# Patient Record
Sex: FEMALE | Race: WHITE | NOT HISPANIC OR LATINO | Employment: FULL TIME | URBAN - METROPOLITAN AREA
[De-identification: names, ages, dates, MRNs, and addresses within clinical notes are randomized per-mention and may not be internally consistent; named-entity substitution may affect disease eponyms.]

---

## 2017-04-21 ENCOUNTER — HISTORICAL (OUTPATIENT)
Dept: RADIOLOGY | Facility: HOSPITAL | Age: 52
End: 2017-04-21

## 2018-04-27 ENCOUNTER — HISTORICAL (OUTPATIENT)
Dept: RADIOLOGY | Facility: HOSPITAL | Age: 53
End: 2018-04-27

## 2018-11-01 ENCOUNTER — HISTORICAL (OUTPATIENT)
Dept: ADMINISTRATIVE | Facility: HOSPITAL | Age: 53
End: 2018-11-01

## 2018-11-08 ENCOUNTER — HISTORICAL (OUTPATIENT)
Dept: MEDSURG UNIT | Facility: HOSPITAL | Age: 53
End: 2018-11-08

## 2018-11-08 ENCOUNTER — HISTORICAL (OUTPATIENT)
Dept: SURGERY | Facility: HOSPITAL | Age: 53
End: 2018-11-08

## 2018-11-08 LAB — POC BETA-HCG (QUAL): NEGATIVE

## 2018-11-09 LAB
ABS NEUT (OLG): 6.28 X10(3)/MCL (ref 2.1–9.2)
ALBUMIN SERPL-MCNC: 3 GM/DL (ref 3.4–5)
ALBUMIN/GLOB SERPL: 1 RATIO (ref 1.1–2)
ALP SERPL-CCNC: 67 UNIT/L (ref 38–126)
ALT SERPL-CCNC: 54 UNIT/L (ref 12–78)
AST SERPL-CCNC: 27 UNIT/L (ref 15–37)
BASOPHILS # BLD AUTO: 0.1 X10(3)/MCL (ref 0–0.2)
BASOPHILS NFR BLD AUTO: 1 %
BILIRUB SERPL-MCNC: 0.6 MG/DL (ref 0.2–1)
BILIRUBIN DIRECT+TOT PNL SERPL-MCNC: 0.1 MG/DL (ref 0–0.5)
BILIRUBIN DIRECT+TOT PNL SERPL-MCNC: 0.5 MG/DL (ref 0–0.8)
BUN SERPL-MCNC: 5 MG/DL (ref 7–18)
CALCIUM SERPL-MCNC: 8.5 MG/DL (ref 8.5–10.1)
CHLORIDE SERPL-SCNC: 106 MMOL/L (ref 98–107)
CO2 SERPL-SCNC: 32 MMOL/L (ref 21–32)
CREAT SERPL-MCNC: 0.49 MG/DL (ref 0.55–1.02)
EOSINOPHIL # BLD AUTO: 0.2 X10(3)/MCL (ref 0–0.9)
EOSINOPHIL NFR BLD AUTO: 2 %
ERYTHROCYTE [DISTWIDTH] IN BLOOD BY AUTOMATED COUNT: 13.6 % (ref 11.5–17)
GLOBULIN SER-MCNC: 3.1 GM/DL (ref 2.4–3.5)
GLUCOSE SERPL-MCNC: 75 MG/DL (ref 74–106)
HCT VFR BLD AUTO: 41.8 % (ref 37–47)
HGB BLD-MCNC: 13.3 GM/DL (ref 12–16)
INR PPP: 1.01 (ref 0–1.27)
LIPASE SERPL-CCNC: 73 UNIT/L (ref 73–393)
LYMPHOCYTES # BLD AUTO: 2.8 X10(3)/MCL (ref 0.6–4.6)
LYMPHOCYTES NFR BLD AUTO: 27 %
MCH RBC QN AUTO: 31.3 PG (ref 27–31)
MCHC RBC AUTO-ENTMCNC: 31.8 GM/DL (ref 33–36)
MCV RBC AUTO: 98.4 FL (ref 80–94)
MONOCYTES # BLD AUTO: 1 X10(3)/MCL (ref 0.1–1.3)
MONOCYTES NFR BLD AUTO: 9 %
NEUTROPHILS # BLD AUTO: 6.28 X10(3)/MCL (ref 2.1–9.2)
NEUTROPHILS NFR BLD AUTO: 61 %
PLATELET # BLD AUTO: 204 X10(3)/MCL (ref 130–400)
PMV BLD AUTO: 10.4 FL (ref 9.4–12.4)
POTASSIUM SERPL-SCNC: 4.3 MMOL/L (ref 3.5–5.1)
PROT SERPL-MCNC: 6.1 GM/DL (ref 6.4–8.2)
PROTHROMBIN TIME: 13.6 SECOND(S) (ref 12.2–14.7)
RBC # BLD AUTO: 4.25 X10(6)/MCL (ref 4.2–5.4)
SODIUM SERPL-SCNC: 143 MMOL/L (ref 136–145)
WBC # SPEC AUTO: 10.4 X10(3)/MCL (ref 4.5–11.5)

## 2018-11-10 LAB
ABS NEUT (OLG): 4.29 X10(3)/MCL (ref 2.1–9.2)
ALBUMIN SERPL-MCNC: 2.7 GM/DL (ref 3.4–5)
ALBUMIN/GLOB SERPL: 0.8 {RATIO}
ALP SERPL-CCNC: 76 UNIT/L (ref 38–126)
ALT SERPL-CCNC: 57 UNIT/L (ref 12–78)
AST SERPL-CCNC: 35 UNIT/L (ref 15–37)
BASOPHILS # BLD AUTO: 0.1 X10(3)/MCL (ref 0–0.2)
BASOPHILS NFR BLD AUTO: 1 %
BILIRUB SERPL-MCNC: 0.4 MG/DL (ref 0.2–1)
BILIRUBIN DIRECT+TOT PNL SERPL-MCNC: 0.1 MG/DL (ref 0–0.2)
BILIRUBIN DIRECT+TOT PNL SERPL-MCNC: 0.3 MG/DL (ref 0–0.8)
BUN SERPL-MCNC: 4 MG/DL (ref 7–18)
CALCIUM SERPL-MCNC: 8.1 MG/DL (ref 8.5–10.1)
CHLORIDE SERPL-SCNC: 105 MMOL/L (ref 98–107)
CO2 SERPL-SCNC: 32 MMOL/L (ref 21–32)
CREAT SERPL-MCNC: 0.55 MG/DL (ref 0.55–1.02)
EOSINOPHIL # BLD AUTO: 0.4 X10(3)/MCL (ref 0–0.9)
EOSINOPHIL NFR BLD AUTO: 5 %
ERYTHROCYTE [DISTWIDTH] IN BLOOD BY AUTOMATED COUNT: 13.6 % (ref 11.5–17)
GLOBULIN SER-MCNC: 3.2 GM/DL (ref 2.4–3.5)
GLUCOSE SERPL-MCNC: 98 MG/DL (ref 74–106)
HCT VFR BLD AUTO: 42.2 % (ref 37–47)
HGB BLD-MCNC: 13.6 GM/DL (ref 12–16)
LYMPHOCYTES # BLD AUTO: 1.8 X10(3)/MCL (ref 0.6–4.6)
LYMPHOCYTES NFR BLD AUTO: 25 %
MCH RBC QN AUTO: 31 PG (ref 27–31)
MCHC RBC AUTO-ENTMCNC: 32.2 GM/DL (ref 33–36)
MCV RBC AUTO: 96.1 FL (ref 80–94)
MONOCYTES # BLD AUTO: 0.7 X10(3)/MCL (ref 0.1–1.3)
MONOCYTES NFR BLD AUTO: 10 %
NEUTROPHILS # BLD AUTO: 4.29 X10(3)/MCL (ref 2.1–9.2)
NEUTROPHILS NFR BLD AUTO: 59 %
PLATELET # BLD AUTO: 190 X10(3)/MCL (ref 130–400)
PMV BLD AUTO: 10.3 FL (ref 9.4–12.4)
POTASSIUM SERPL-SCNC: 4.4 MMOL/L (ref 3.5–5.1)
PROT SERPL-MCNC: 5.9 GM/DL (ref 6.4–8.2)
RBC # BLD AUTO: 4.39 X10(6)/MCL (ref 4.2–5.4)
SODIUM SERPL-SCNC: 142 MMOL/L (ref 136–145)
WBC # SPEC AUTO: 7.3 X10(3)/MCL (ref 4.5–11.5)

## 2018-12-18 LAB — CRC RECOMMENDATION EXT: NORMAL

## 2019-03-08 ENCOUNTER — HISTORICAL (OUTPATIENT)
Dept: ADMINISTRATIVE | Facility: HOSPITAL | Age: 54
End: 2019-03-08

## 2019-03-20 ENCOUNTER — HISTORICAL (OUTPATIENT)
Dept: RADIOLOGY | Facility: HOSPITAL | Age: 54
End: 2019-03-20

## 2019-05-03 ENCOUNTER — HISTORICAL (OUTPATIENT)
Dept: RADIOLOGY | Facility: HOSPITAL | Age: 54
End: 2019-05-03

## 2020-03-18 ENCOUNTER — HISTORICAL (OUTPATIENT)
Dept: ADMINISTRATIVE | Facility: HOSPITAL | Age: 55
End: 2020-03-18

## 2020-08-03 ENCOUNTER — HISTORICAL (OUTPATIENT)
Dept: RADIOLOGY | Facility: HOSPITAL | Age: 55
End: 2020-08-03

## 2021-06-30 LAB
PAP RECOMMENDATION EXT: NORMAL
PAP SMEAR: NORMAL

## 2021-07-02 ENCOUNTER — HISTORICAL (OUTPATIENT)
Dept: ADMINISTRATIVE | Facility: HOSPITAL | Age: 56
End: 2021-07-02

## 2021-08-06 ENCOUNTER — HISTORICAL (OUTPATIENT)
Dept: RADIOLOGY | Facility: HOSPITAL | Age: 56
End: 2021-08-06

## 2022-02-18 ENCOUNTER — CLINICAL SUPPORT (OUTPATIENT)
Dept: NUTRITION | Facility: CLINIC | Age: 57
End: 2022-02-18
Payer: COMMERCIAL

## 2022-02-18 DIAGNOSIS — Z71.3 DIETARY COUNSELING AND SURVEILLANCE: Primary | ICD-10-CM

## 2022-02-18 PROCEDURE — 97802 PR MED NUTR THER, 1ST, INDIV, EA 15 MIN: ICD-10-PCS | Mod: 95,,, | Performed by: NUTRITIONIST

## 2022-02-18 PROCEDURE — 97802 MEDICAL NUTRITION INDIV IN: CPT | Mod: 95,,, | Performed by: NUTRITIONIST

## 2022-02-18 NOTE — PROGRESS NOTES
"Nutrition Assessment for Initial Medical Nutrition Therapy  The patient location is: [place]  The chief complaint leading to consultation is: weight loss    Visit type: audiovisual    Face to Face time with patient: 1 hour of total time spent on the encounter, which includes face to face time and non-face to face time preparing to see the patient (eg, review of tests), Obtaining and/or reviewing separately obtained history, Documenting clinical information in the electronic or other health record, Independently interpreting results (not separately reported) and communicating results to the patient/family/caregiver, or Care coordination (not separately reported).         Each patient to whom he or she provides medical services by telemedicine is:  (1) informed of the relationship between the physician and patient and the respective role of any other health care provider with respect to management of the patient; and (2) notified that he or she may decline to receive medical services by telemedicine and may withdraw from such care at any time.      Reason for MNT visit: Pt in for education and nutrition counseling regarding dietary counseling for goal of body fat loss      ASSESSMENT    Age: 56 y.o.  Wt: 236 lbs  Wt Readings from Last 1 Encounters:   No data found for Wt     Ht: 5'4"  Ht Readings from Last 1 Encounters:   No data found for Ht     BMI: 40.5  BMI Readings from Last 1 Encounters:   No data found for BMI       Clinical Signs/Symptoms: none to assess    Medical History: No past medical history on file.      Medications: No current outpatient medications on file.    Food allergies  Intolerances: NKFA    Labs:  Reviewed and noted  No results found for: HGBA1C  No results found for: CHOL, TRIG, HDL, LDLCALC    Typical day recall: reviewed and noted during virtual consult    Beverages: water: 7 bottles per day    Dining out: rare    Alcohol: nonsmoker; drinks a glass of wine or two socially    Lifestyle " Influences  Support System: self (recently )    Meal preparation/shopping: self    Current Activity Level: walks 8,000-10,000 steps per day    Patient motivation, anticipated barriers, expected compliance: patient is motivated and has verbalized understanding and intent to comply    DIAGNOSIS   Problem: Excessive Energy Intake  Etiology: RT eating too many calories  Signs/Symptoms: AEB 24 hour recall and BMI    INTERVENTION  Nutrition prescription: estimated energy requirements: patient is motivated and has verbalized understanding and intent to comply      Recommendations & Goals:  Patient goals and recommendations are tailored to the specific patient's needs, readiness to change, lifestyle, culture, skills, resources, & abilities. Strategies to help achieve these nutrition-related goals were discussed which can include but are not limited to SMART goal setting & mindful eating.     Aim for a minimum of 7 hours sleep   Exercise 60 minutes most days  Eat breakfast within 1-2 hours of waking up  Try not to skip any meals or snacks, not going more than 3-4 hours without eating.   At each meal and snack, try to include a source of fiber + lean protein + healthy fat.       Written Materials Provided  These resources are intended to assist the patient in making it easier to choose recommended options when eating out & to identify better-for-you brands at the grocery store:     Meal Planning Guide with recommendations discussed along with portion sizes and a customized meal plan    Eat Fit stu card as a reminder to download the stu to ones smart phone which provides: current Eat Fit partners, approved menu options, food labels for carb counting, & recipes    Nutritious On The Go Guide   Eat Fit Grocery Product list    RD contact information- for patient to contact regarding any questions, needs, and/or concerns that may arise     MONITORING & EVALUATION    Communicated with healthcare provider    Documented  plan for referral to appropriate agency/healthcare provider as needed    Comprehension: fair     Motivation to change: moderate    Follow-up: 1 month or prn    Counseling time: 1 Hour

## 2022-04-07 ENCOUNTER — HISTORICAL (OUTPATIENT)
Dept: ADMINISTRATIVE | Facility: HOSPITAL | Age: 57
End: 2022-04-07
Payer: COMMERCIAL

## 2022-04-24 VITALS
DIASTOLIC BLOOD PRESSURE: 68 MMHG | OXYGEN SATURATION: 98 % | WEIGHT: 220 LBS | HEIGHT: 64 IN | SYSTOLIC BLOOD PRESSURE: 120 MMHG | BODY MASS INDEX: 37.56 KG/M2

## 2022-04-28 NOTE — OP NOTE
Patient:   Trista Nielsen            MRN: [id number]            FIN: [id number]               Age:   53 years     Sex:  Female     :  1965   Associated Diagnoses:   None   Author:   Dieudonne Messina MD            DATE OF SURGERY:   18    SURGEON:  Dieudonne Messina MD    PREOPERATIVE DIAGNOSIS: 1.  Symptomatic Cholelithiasis    POST OPERATIVE DIAGNOSIS: 1.  Same.       2.  Choledocholithiasis    PROCEDURE:  Laparoscopic cholecystectomy with Intra-operative cholangiogram.    ANESTHESIA:  General endotracheal anesthesia.    ESTIMATED BLOOD LOSS:  Approximately 20 cc.   Blood administered, none.    LAP AND INSTRUMENT COUNT:  Correct X2 at the end of the case.    SPECIMENS:  Gallbladder.    INDICATION AND SIGNIFICANT HISTORY:  Patient is a 53-year-old female complaining of post prandial right upper quadrant pain associated with fatty meals.  Patient was worked up clincally and found to have symptomatic cholelithiasis.  Risks and benefits of surgery was discussed with the patient who voiced understanding of risks / benefits and elected to proceed with surgery.        PROCEDURE IN DETAIL:  Once informed consents were obtained, patient was taken to the operating room and placed supine on the operating table.  After general endotracheal anesthesia was induced, the abdomen was prepped and draped in the standard sterile surgical fashion.  Periumbilical incision was made with the scalpel and the Veress needle was used to enter the abdominal cavity.  Pneumoperitoneum was then created with insufflation.  After adequate insufflation was obtained, 11 millimeter trocar port were placed through this wound.  Two additional 5 millimeter ports were placed in the right upper quadrant followed by 5 millimeter trocar placed subxiphoid.  The gallbladder was then visualized appeared to have chronic inflammatory changes and retracted both superiorly and laterally to achieve the critical view.  Dissection was  carried out in lateral to medial fashion.  The cystic duct were first visualized followed by cystic artery appropriate.  The common bile duct was visualized and appreared dilated.  The Mitchell clamp was used to perform a cholangiogram and the patient appered to have multiple common duct stones.  The cystic duct was then controlled with a chromic loop (for preperation of ERCP).  Two clips were placed twice proximally on the cystic artery and one distally and resected.  The gallbladder was then removed from the liver bed using the hook cautery and passed off the table as specimen through the periumbilical trocar port site.  Attention was then redirected to the gallbladder fossa, no active bleeding was noted.  Good hemostasis was visualized.  All ports were then removed under direct visualization with no active bleeding noted.  Skin was then closed with 4-0 Vicryl suture in interrupted fashion.  Skin was  cleaned and dry sterile dressing was placed on the wound.  Lap and instrument  counts were correct X2 at the end of the case.  Patient tolerated the procedure well and was transferred to recovery room in good condition with plans to consult GI for ERCP.

## 2022-04-28 NOTE — CONSULTS
Patient:   Trista Nielsen            MRN: [id number]            FIN: [id number]               Age:   53 years     Sex:  Female     :  1965   Associated Diagnoses:   None   Author:   Hope Colunga      Consultation Information   Consultation:  Siddharth Koch MD, Dieudonne PRINCE       Basic Information   Admit information:  CBD stones post Lap choley .    Source of history:  Self, Spouse, Medical record.    Present at bedside:  Significant other.    Referral source:  Emergency department.    History limitation:  None.       Chief Complaint   stones in CBD post lap choley      History of Present Illness   This is a very pleasant 52 yo female who underwent a laproscopic choleycystectomy yesterday at [facility] with Dr. Messina. Pt IOC showed abdnormalities with stones and sludge. Pt was subsequently admitted to [facility] for ERCP. Pt has slight psot op tenderness to abdomen, some left shuolder pain and lap sites are CDI. Pt not passing flatus yet, is ambulating. LFTs and Lipase are within normal limits. We will plan ERCP today.        Review of Systems   Constitutional:  Negative.    Eye:  Negative.    Ear/Nose/Mouth/Throat:  Negative.    Respiratory:  Negative.    Cardiovascular:  Negative.    Gastrointestinal:  Negative except as documented in history of present illness.    Genitourinary:  Negative.    Hematology/Lymphatics:  Negative.    Endocrine:  Negative.    Immunologic:  Negative.    Musculoskeletal:  Negative.    Integumentary:  Negative.    Neurologic:  Alert and oriented X4.    Psychiatric:  Negative.       Health Status   Allergies:    Allergic Reactions (Selected)  No Known Allergies   Current medications:  (Selected)   Inpatient Medications  Ordered  Demerol HCl 50 mg/mL injectable solution: 50 mg, form: Injection, IV, q2hr PRN for pain, first dose 18 18:31:00 CST  Lactated Ringers 1000ml 1,000 mL: 1,000 mL, 1,000 mL, IV, 75 mL/hr, start date 18 18:03:00  CST  Zofran 2 mg/mL injectable solution: 4 mg, form: Injection, IV Push, q4hr PRN for nausea, first dose 18 19:32:00 CST  Prescriptions  Prescribed  Levsin SL 0.125 mg sublingual tablet: 0.125 mg = 1 tab(s), SL, q4hr, PRN PRN cramping/spasm, # 18 tab(s), 0 Refill(s), Pharmacy: [id number]  Documented Medications  Documented  BUSPIRONE    TAB 15M.5 mg = 0.5 tab(s), Oral, BID  Vitamin D3 5000 intl units oral tablet: 5,000 IntUnit = 1 tab(s), Oral, Daily, # 100 tab(s), 0 Refill(s)  Zyrtec 10 mg oral tablet: 10 mg = 1 tab(s), Oral, Daily, # 30 tab(s), 0 Refill(s)  multivitamin with minerals (Adult Tab): 1 tab(s), Oral, Daily, # 30 tab(s), 0 Refill(s)  raNITIdine 150 mg oral capsule: 150 mg = 1 cap(s), Oral, Daily, # 90 cap(s), 0 Refill(s)      Histories   Past Medical History:    Active  GERD - Gastro-esophageal reflux disease ([id number])   Family History:    Acute myocardial infarction.  Father  Heart disease.  Father  Depression.  Mother  Hyperlipidemia.  Mother     Procedure history:    Cholecystectomy Laparoscopic (None) on 2018 at 53 Years.  Comments:  2018 14:04 - Macie Hutchison RN  auto-populated from documented surgical case  Cholangiogram (Surgery) on 2018 at 53 Years.  Comments:  2018 14:04 Macie Cote RN  auto-populated from documented surgical case  Thermal Ablation (.) on 2016 at 50 Years.  Comments:  2016 13:Tammy Stahl RN  auto-populated from documented surgical case  Tubal Ligation Laparoscopic (.) on 2016 at 50 Years.  Comments:  2016 13:Tammy Stahl RN  auto-populated from documented surgical case  D and C.  Comments:  2018 10:56 - Divya Velasquez RN  2010  EGD.  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        Physical Examination   General:  Alert and oriented, No acute distress.    Eye:  Pupils are equal, round and reactive to light, Extraocular movements are intact, Normal conjunctiva.    HENT:  Normocephalic, Tympanic membranes are clear, Normal hearing, Oral mucosa is moist, No pharyngeal erythema.    Neck:  Supple, Non-tender, No carotid bruit, No jugular venous distention.    Respiratory:  Lungs are clear to auscultation, Respirations are non-labored, Breath sounds are equal, Symmetrical chest wall expansion.    Cardiovascular:  Normal rate, Regular rhythm, No edema.    Gastrointestinal:  Soft, Non-distended, hypoactive, mild tenderness, lap sites CDI.    Genitourinary:  No costovertebral angle tenderness.    Musculoskeletal:  Normal range of motion, Normal strength.    Integumentary:  Warm, Dry, Pink.    Cognition and Speech:  Speech clear and coherent.       Review / Management   Results review:  All Results   11/9/2018 7:16 CST       WBC                       10.4 x10(3)/mcL                             RBC                       4.25 x10(6)/mcL                             Hgb                       13.3 gm/dL                             Hct                       41.8 %                             Platelet                  204 x10(3)/mcL                             MCV                       98.4 fL  HI                             MCH                       31.3 pg  HI                             MCHC                      31.8 gm/dL  LOW                             RDW                       13.6 %                             MPV                        10.4 fL                             Abs Neut                  6.28 x10(3)/mcL                             Neutro Auto               61 %  NA                             Lymph Auto                27 %  NA                             Mono Auto                 9 %  NA                             Eos Auto                  2 %  NA                             Abs Eos                   0.2 x10(3)/mcL                             Basophil Auto             1 %  NA                             Abs Neutro                6.28 x10(3)/mcL                             Abs Lymph                 2.8 x10(3)/mcL                             Abs Mono                  1.0 x10(3)/mcL                             Abs Baso                  0.1 x10(3)/mcL                             PT                        13.6 second(s)                             INR                       1.01                             Sodium Lvl                143 mmol/L                             Potassium Lvl             4.3 mmol/L                             Chloride                  106 mmol/L                             CO2                       32.0 mmol/L                             Calcium Lvl               8.5 mg/dL                             Glucose Lvl               75 mg/dL                             BUN                       5.0 mg/dL  LOW                             Creatinine                0.49 mg/dL  LOW                             eGFR-AA                   >60 mL/min/1.73 m2  NA                             eGFR-SHERWIN                  >60 mL/min/1.73 m2  NA                             Bili Total                0.6 mg/dL                             Bili Direct               0.10 mg/dL                             Bili Indirect             0.50 mg/dL                             AST                       27 unit/L                             ALT                       54 unit/L                             Alk Phos                  67 unit/L                              Total Protein             6.1 gm/dL  LOW                             Albumin Lvl               3.00 gm/dL  LOW                             Globulin                  3.10 gm/dL                             A/G Ratio                 1.0 ratio  LOW                             Lipase Lvl                73 unit/L  .       Impression and Plan   Orders     IMPRESSION/PLAN  1. Abdmormal IOC - CBD stones/sludge post lap choley    keep NPO and proceed with ERCP at noon    Thank you for this consultation, we will be happy to follow with you.        Professional Services   Entered by Hope Miranda NP, acting as a scribe for Van Menjivar MD

## 2022-04-28 NOTE — H&P
"   Patient:   Trista Nielsen            MRN: [id number]            FIN: [id number]               Age:   53 years     Sex:  Female     :  1965   Associated Diagnoses:   None   Author:   Dieudonne Messina MD      Chief Complaint   "CBD stones"      History of Present Illness     52 yo female s/p cholecystectomy with IOC with CBD stones.    See previous H&P      Review of Systems   Eye:  Negative.    Ear/Nose/Mouth/Throat:  Negative.    Respiratory:  Negative.    Cardiovascular:  Negative.    Gastrointestinal:  Negative.    Genitourinary:  Negative.    Hematology/Lymphatics:  Negative.    Endocrine:  Negative.    Immunologic:  Negative.    Musculoskeletal:  Negative.    Integumentary:  Negative.    Neurologic:  Negative.    Psychiatric:  Negative.    All other systems are negative      Health Status   Allergies:    Allergic Reactions (Selected)  No Known Allergies   Current medications:  (Selected)   Prescriptions  Prescribed  Levsin SL 0.125 mg sublingual tablet: 0.125 mg = 1 tab(s), SL, q4hr, PRN PRN cramping/spasm, # 18 tab(s), 0 Refill(s), Pharmacy: [id number]  Documented Medications  Documented  BUSPIRONE    TAB 15M.5 mg = 0.5 tab(s), Oral, BID  Vitamin D3 5000 intl units oral tablet: 5,000 IntUnit = 1 tab(s), Oral, Daily, # 100 tab(s), 0 Refill(s)  Zyrtec 10 mg oral tablet: 10 mg = 1 tab(s), Oral, Daily, # 30 tab(s), 0 Refill(s)  multivitamin with minerals (Adult Tab): 1 tab(s), Oral, Daily, # 30 tab(s), 0 Refill(s)  raNITIdine 150 mg oral capsule: 150 mg = 1 cap(s), Oral, Daily, # 90 cap(s), 0 Refill(s)   Problem list:    All Problems  Anxiety / SNOMED CT [id number] / Confirmed  Esophageal spasm / SNOMED CT 7784174550 / Confirmed  ER 18, 18  Gallstones / SNOMED CT 358965042 / Confirmed  GERD (gastroesophageal reflux disease) / SNOMED CT [id number] / Confirmed  GERD - Gastro-esophageal reflux disease / SNOMED CT " 4292103513 / Confirmed  Hiatal hernia / SNOMED CT [id number] / Confirmed  Migraines / SNOMED CT [id number] / Confirmed  Ovarian cyst / SNOMED CT 426851015 / Confirmed  ER on 11/2/18 on the right side  Seasonal allergy / SNOMED CT 2421276666 / Confirmed  Wears glasses / SNOMED CT 465447120 / Confirmed,    Active Problems (10)  Anxiety   Esophageal spasm   Gallstones   GERD (gastroesophageal reflux disease)   GERD - Gastro-esophageal reflux disease   Hiatal hernia   Migraines   Ovarian cyst   Seasonal allergy   Wears glasses       Histories   Past Medical History:    Active  GERD - Gastro-esophageal reflux disease ([id number])   Family History:    Acute myocardial infarction.  Father  Heart disease.  Father  Depression.  Mother  Hyperlipidemia.  Mother     Procedure history:    Endoscopic Retrograde Cholangiopancreatography performed by Van Menjivar MD on 11/9/2018 at 53 Years.  Comments:  11/12/2018 10:42 - Rosaura Barth RN  auto-populated from documented surgical case  Sphincterotomy performed by Van Menjivar MD on 11/9/2018 at 53 Years.  Comments:  11/12/2018 10:42 - Rosaura Barth RN  auto-populated from documented surgical case  ERCP w/removal of calculi/debris from biliary/pancreatic duct(s) performed by Van Menjivar MD on 11/9/2018 at 53 Years.  Comments:  11/12/2018 10:42 - Rosaura Barth RN  auto-populated from documented surgical case  Cholecystectomy Laparoscopic (None) performed by Dieudonne Messina MD on 11/8/2018 at 53 Years.  Comments:  11/8/2018 14:04 - Macie Hutchison RN  auto-populated from documented surgical case  Cholangiogram (Surgery) performed by Dieudonne Messina MD on 11/8/2018 at 53 Years.  Comments:  11/8/2018 14:04 - Macie Hutchison RN.  auto-populated from documented surgical case  Thermal Ablation (.) performed by Belen Healy MD on 8/23/2016 at 50 Years.  Comments:  8/23/2016 13:11 - Tammy Rico RN  BERTHA.  auto-populated from documented surgical case  Tubal Ligation Laparoscopic (.) performed by Belen Healy MD on 8/23/2016 at 50 Years.  Comments:  8/23/2016 13:11 - Tammy Rico RN  auto-populated from documented surgical case  D and C.  Comments:  11/7/2018 10:56 - Divya Velasquez RN  2010  EGD.  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        Physical Examination   General:  Alert and oriented.    Eye:  Pupils are equal, round and reactive to light.    HENT:  Normocephalic.    Neck:  Supple.    Respiratory:  Lungs are clear to auscultation.    Cardiovascular:  Normal rate.    Gastrointestinal:  Soft, Non-tender, Non-distended, Normal bowel sounds.       No qualifying data available   Genitourinary:  No costovertebral angle tenderness.    Musculoskeletal:  Normal range of motion, Normal strength.    Neurologic:  Alert, Oriented.       Impression and Plan     52 yo yo female with CBD    -  GI - already spoke to  -  NPO

## 2022-04-28 NOTE — CONSULTS
Patient:   Trista Nielsen            MRN: [id number]            FIN: [id number]               Age:   53 years     Sex:  Female     :  1965   Associated Diagnoses:   None   Author:   Geovanny ESTES, Ministerio BOONE      Basic Information   Review finding of laparoscopy concerning fallopian tube   retrograde bleeding  Patient asymptomatic  Plan: she will follow up with Dr. Healy next week

## 2022-06-21 ENCOUNTER — OFFICE VISIT (OUTPATIENT)
Dept: ORTHOPEDICS | Facility: CLINIC | Age: 57
End: 2022-06-21
Payer: COMMERCIAL

## 2022-06-21 ENCOUNTER — HOSPITAL ENCOUNTER (OUTPATIENT)
Dept: RADIOLOGY | Facility: CLINIC | Age: 57
Discharge: HOME | End: 2022-06-21
Attending: ORTHOPAEDIC SURGERY
Payer: COMMERCIAL

## 2022-06-21 VITALS
SYSTOLIC BLOOD PRESSURE: 120 MMHG | WEIGHT: 220 LBS | HEIGHT: 64 IN | BODY MASS INDEX: 37.56 KG/M2 | DIASTOLIC BLOOD PRESSURE: 68 MMHG

## 2022-06-21 DIAGNOSIS — M70.52 PES ANSERINUS BURSITIS OF LEFT KNEE: Primary | ICD-10-CM

## 2022-06-21 DIAGNOSIS — E66.01 OBESITIES, MORBID: ICD-10-CM

## 2022-06-21 DIAGNOSIS — M25.562 ACUTE PAIN OF LEFT KNEE: ICD-10-CM

## 2022-06-21 PROCEDURE — 3078F PR MOST RECENT DIASTOLIC BLOOD PRESSURE < 80 MM HG: ICD-10-PCS | Mod: CPTII,,, | Performed by: ORTHOPAEDIC SURGERY

## 2022-06-21 PROCEDURE — 1160F RVW MEDS BY RX/DR IN RCRD: CPT | Mod: CPTII,,, | Performed by: ORTHOPAEDIC SURGERY

## 2022-06-21 PROCEDURE — 1159F PR MEDICATION LIST DOCUMENTED IN MEDICAL RECORD: ICD-10-PCS | Mod: CPTII,,, | Performed by: ORTHOPAEDIC SURGERY

## 2022-06-21 PROCEDURE — 3078F DIAST BP <80 MM HG: CPT | Mod: CPTII,,, | Performed by: ORTHOPAEDIC SURGERY

## 2022-06-21 PROCEDURE — 99204 OFFICE O/P NEW MOD 45 MIN: CPT | Mod: 25,,, | Performed by: ORTHOPAEDIC SURGERY

## 2022-06-21 PROCEDURE — 3008F BODY MASS INDEX DOCD: CPT | Mod: CPTII,,, | Performed by: ORTHOPAEDIC SURGERY

## 2022-06-21 PROCEDURE — 20610 LARGE JOINT ASPIRATION/INJECTION: L ANSERINE BURSA: ICD-10-PCS | Mod: LT,,, | Performed by: ORTHOPAEDIC SURGERY

## 2022-06-21 PROCEDURE — 73562 XR KNEE 3 VIEW LEFT: ICD-10-PCS | Mod: LT,,, | Performed by: ORTHOPAEDIC SURGERY

## 2022-06-21 PROCEDURE — 1159F MED LIST DOCD IN RCRD: CPT | Mod: CPTII,,, | Performed by: ORTHOPAEDIC SURGERY

## 2022-06-21 PROCEDURE — 3008F PR BODY MASS INDEX (BMI) DOCUMENTED: ICD-10-PCS | Mod: CPTII,,, | Performed by: ORTHOPAEDIC SURGERY

## 2022-06-21 PROCEDURE — 3074F PR MOST RECENT SYSTOLIC BLOOD PRESSURE < 130 MM HG: ICD-10-PCS | Mod: CPTII,,, | Performed by: ORTHOPAEDIC SURGERY

## 2022-06-21 PROCEDURE — 73562 X-RAY EXAM OF KNEE 3: CPT | Mod: LT,,, | Performed by: ORTHOPAEDIC SURGERY

## 2022-06-21 PROCEDURE — 99204 PR OFFICE/OUTPT VISIT, NEW, LEVL IV, 45-59 MIN: ICD-10-PCS | Mod: 25,,, | Performed by: ORTHOPAEDIC SURGERY

## 2022-06-21 PROCEDURE — 3074F SYST BP LT 130 MM HG: CPT | Mod: CPTII,,, | Performed by: ORTHOPAEDIC SURGERY

## 2022-06-21 PROCEDURE — 1160F PR REVIEW ALL MEDS BY PRESCRIBER/CLIN PHARMACIST DOCUMENTED: ICD-10-PCS | Mod: CPTII,,, | Performed by: ORTHOPAEDIC SURGERY

## 2022-06-21 PROCEDURE — 20610 DRAIN/INJ JOINT/BURSA W/O US: CPT | Mod: LT,,, | Performed by: ORTHOPAEDIC SURGERY

## 2022-06-21 RX ORDER — CETIRIZINE HYDROCHLORIDE 10 MG/1
CAPSULE, LIQUID FILLED ORAL
COMMUNITY
Start: 2018-08-01

## 2022-06-21 RX ORDER — BETAMETHASONE SODIUM PHOSPHATE AND BETAMETHASONE ACETATE 3; 3 MG/ML; MG/ML
6 INJECTION, SUSPENSION INTRA-ARTICULAR; INTRALESIONAL; INTRAMUSCULAR; SOFT TISSUE
Status: DISCONTINUED | OUTPATIENT
Start: 2022-06-21 | End: 2022-06-21 | Stop reason: HOSPADM

## 2022-06-21 RX ORDER — LIDOCAINE HYDROCHLORIDE 20 MG/ML
2 INJECTION, SOLUTION INFILTRATION; PERINEURAL
Status: DISCONTINUED | OUTPATIENT
Start: 2022-06-21 | End: 2022-06-21 | Stop reason: HOSPADM

## 2022-06-21 RX ORDER — CITALOPRAM 40 MG/1
40 TABLET, FILM COATED ORAL DAILY
COMMUNITY
Start: 2022-02-04 | End: 2023-02-10

## 2022-06-21 RX ADMIN — LIDOCAINE HYDROCHLORIDE 2 ML: 20 INJECTION, SOLUTION INFILTRATION; PERINEURAL at 09:06

## 2022-06-21 RX ADMIN — BETAMETHASONE SODIUM PHOSPHATE AND BETAMETHASONE ACETATE 6 MG: 3; 3 INJECTION, SUSPENSION INTRA-ARTICULAR; INTRALESIONAL; INTRAMUSCULAR; SOFT TISSUE at 09:06

## 2022-06-21 NOTE — PROGRESS NOTES
"History of present illness:    This is a 56 y.o. year old female presents today with left knee pain that she sustained approximately 3 months ago.  She notes her pain is along the medial proximal aspect of her tibia.  She has still been able to work.  She states most her pain is in the morning when she gets up feels very stiff painful.  States it gets better as the day goes.    History reviewed. No pertinent past medical history.    Past Surgical History:   Procedure Laterality Date    DNC      GALLBLADDER SURGERY      TUBAL LIGATION         Current Outpatient Medications   Medication Sig    cetirizine (ZYRTEC) 10 mg Cap     citalopram (CELEXA) 40 MG tablet Take 40 mg by mouth once daily.     No current facility-administered medications for this visit.       Review of patient's allergies indicates:  No Known Allergies    History reviewed. No pertinent family history.    Social History     Socioeconomic History    Marital status:    Tobacco Use    Smoking status: Never Smoker    Smokeless tobacco: Never Used       Chief Complaint:   Chief Complaint   Patient presents with    Left Knee - Pain    Pain     patient states she had a fall DOI: 4/4/22, minimal swelling and pain in the back or her knee states she has some brusing around her ankle       Consulting Physician: No ref. provider found      Review of Systems:    All review of systems negative except for those stated in the HPI    Examination:    Vital Signs:    Vitals:    06/21/22 1019   BP: 120/68   Weight: 99.8 kg (220 lb)   Height: 5' 4" (1.626 m)   PainSc:   8       Body mass index is 37.76 kg/m².    Physical Exam:   General: Well-developed, well-nourished.  Neuro: Alert and oriented x 3.  Psych: Normal mood and affect.  Knee Exam:    No obvious deformity. Range of motion from 0-115 degrees. Negative patella grind and equal subluxation of knee cap medial and lateral < 1cm. Negative patella tendon tenderness. Negative Lachman and anterior " drawer test. Negative posterior drawer test. Negative varus and valgus stress test. Negative medial joint line tenderness. Negative lateral joint line tenderness. 5/5 strength and normal skin appearance. Sensibility normal    Imaging: X-rays ordered and images interpreted today personally by me of three views of her left knee show minimal degenerative changes no other osseous pathology.         Assessment: Pes anserinus bursitis of left knee  -     Large Joint Aspiration/Injection: L anserine bursa    Acute pain of left knee  -     X-Ray Knee 3 View Left; Future; Expected date: 06/21/2022    Obesities, morbid        Plan:    We will start with just a simple phrases reamed bursitis steroid injection of her left knee today.  I will have her come back for repeat evaluation in 4 weeks.  She can return back to her normal activities in 48 hours.     Clinical Reference Documents Added to Patient Instructions       Document    PES ANSERINE BURSITIS (ENGLISH)          Large Joint Aspiration/Injection: L anserine bursa    Date/Time: 6/21/2022 9:45 AM  Performed by: Tonio Perkins MD  Authorized by: Tonio Perkins MD     Consent Done?:  Yes (Verbal)  Indications:  Pain  Timeout: prior to procedure the correct patient, procedure, and site was verified    Prep: patient was prepped and draped in usual sterile fashion    Local anesthesia used?: No      Details:  Needle Size:  25 G  Ultrasonic Guidance for needle placement?: No    Approach:  Anteromedial  Location:  Knee  Site:  L anserine bursa  Medications:  2 mL LIDOcaine HCL 20 mg/ml (2%) 20 mg/mL (2 %); 6 mg betamethasone acetate-betamethasone sodium phosphate 6 mg/mL  Patient tolerance:  Patient tolerated the procedure well with no immediate complications           DISCLAIMER: This note may have been dictated using voice recognition software and may contain grammatical errors.     NOTE: Consult report sent to referring provider via [employer].

## 2022-06-21 NOTE — PROCEDURES
Large Joint Aspiration/Injection: L anserine bursa    Date/Time: 6/21/2022 9:45 AM  Performed by: Tonio Perkins MD  Authorized by: Tonio Perkins MD     Consent Done?:  Yes (Verbal)  Indications:  Pain  Timeout: prior to procedure the correct patient, procedure, and site was verified    Prep: patient was prepped and draped in usual sterile fashion    Local anesthesia used?: No      Details:  Needle Size:  25 G  Ultrasonic Guidance for needle placement?: No    Approach:  Anteromedial  Location:  Knee  Site:  L anserine bursa  Medications:  2 mL LIDOcaine HCL 20 mg/ml (2%) 20 mg/mL (2 %); 6 mg betamethasone acetate-betamethasone sodium phosphate 6 mg/mL  Patient tolerance:  Patient tolerated the procedure well with no immediate complications

## 2022-06-27 ENCOUNTER — PATIENT MESSAGE (OUTPATIENT)
Dept: INTERNAL MEDICINE | Facility: CLINIC | Age: 57
End: 2022-06-27
Payer: COMMERCIAL

## 2022-06-27 DIAGNOSIS — Z12.31 VISIT FOR SCREENING MAMMOGRAM: Primary | ICD-10-CM

## 2022-07-28 ENCOUNTER — OFFICE VISIT (OUTPATIENT)
Dept: ORTHOPEDICS | Facility: CLINIC | Age: 57
End: 2022-07-28
Payer: COMMERCIAL

## 2022-07-28 VITALS — HEIGHT: 64 IN | BODY MASS INDEX: 37.56 KG/M2 | WEIGHT: 220 LBS

## 2022-07-28 DIAGNOSIS — E66.01 OBESITIES, MORBID: ICD-10-CM

## 2022-07-28 DIAGNOSIS — M70.52 PES ANSERINUS BURSITIS OF LEFT KNEE: Primary | ICD-10-CM

## 2022-07-28 PROCEDURE — 99214 PR OFFICE/OUTPT VISIT, EST, LEVL IV, 30-39 MIN: ICD-10-PCS | Mod: ,,, | Performed by: ORTHOPAEDIC SURGERY

## 2022-07-28 PROCEDURE — 1159F MED LIST DOCD IN RCRD: CPT | Mod: CPTII,,, | Performed by: ORTHOPAEDIC SURGERY

## 2022-07-28 PROCEDURE — 3008F PR BODY MASS INDEX (BMI) DOCUMENTED: ICD-10-PCS | Mod: CPTII,,, | Performed by: ORTHOPAEDIC SURGERY

## 2022-07-28 PROCEDURE — 3008F BODY MASS INDEX DOCD: CPT | Mod: CPTII,,, | Performed by: ORTHOPAEDIC SURGERY

## 2022-07-28 PROCEDURE — 1160F RVW MEDS BY RX/DR IN RCRD: CPT | Mod: CPTII,,, | Performed by: ORTHOPAEDIC SURGERY

## 2022-07-28 PROCEDURE — 99214 OFFICE O/P EST MOD 30 MIN: CPT | Mod: ,,, | Performed by: ORTHOPAEDIC SURGERY

## 2022-07-28 PROCEDURE — 1159F PR MEDICATION LIST DOCUMENTED IN MEDICAL RECORD: ICD-10-PCS | Mod: CPTII,,, | Performed by: ORTHOPAEDIC SURGERY

## 2022-07-28 PROCEDURE — 1160F PR REVIEW ALL MEDS BY PRESCRIBER/CLIN PHARMACIST DOCUMENTED: ICD-10-PCS | Mod: CPTII,,, | Performed by: ORTHOPAEDIC SURGERY

## 2022-07-28 RX ORDER — DICLOFENAC SODIUM 50 MG/1
50 TABLET, DELAYED RELEASE ORAL 2 TIMES DAILY PRN
Qty: 60 TABLET | Refills: 0 | Status: SHIPPED | OUTPATIENT
Start: 2022-07-28 | End: 2022-08-27

## 2022-07-28 NOTE — PROGRESS NOTES
[facility]  Orthopedic Clinic Note      Chief Complaint:   Chief Complaint   Patient presents with    Follow-up     LEFT KNEE LAST INJECT 6/21/22 STATES IT HELPED FOR A FEW DAYS, THAN IT STARTED AGAIN.  SOME DAY THE PAIN WILL RADIATE TO THE BACK OR BELOW THE KNEE CAP. DENIES FALL/INJURY      Referring Physician: No ref. provider found      History of present illness:    This is a 56 y.o. year old female presents today with left knee pain that she sustained approximately 3 months ago.  She notes her pain is along the medial proximal aspect of her tibia.  She has still been able to work.  She states most her pain is in the morning when she gets up feels very stiff painful.  States it gets better as the day goes.  7/28/2022 Patient presents for follow-up on left knee pain.  She received a left knee pes anserine bursa corticosteroid injection at her prior visit.  She reports that it was helpful for approximately 1-2 weeks.  She states that she has good days and bad days.  The pain along the medial proximal aspect of her tibia has largely resolved.  Now she localizes more pain over the lateral and posterior aspect of her knee.  She does note some periodic locking and catching of the left knee.  She has been taking over-the-counter BC powder with minimal relief of symptoms.    History reviewed. No pertinent past medical history.    Past Surgical History:   Procedure Laterality Date    DNC      GALLBLADDER SURGERY      TUBAL LIGATION         Current Outpatient Medications   Medication Sig    cetirizine (ZYRTEC) 10 mg Cap     citalopram (CELEXA) 40 MG tablet Take 40 mg by mouth once daily.    diclofenac (VOLTAREN) 50 MG EC tablet Take 1 tablet (50 mg total) by mouth 2 (two) times daily as needed (pain). Take with food     No current facility-administered medications for this visit.       Review of patient's allergies indicates:  No Known Allergies    History reviewed. No pertinent family history.    Social  "History     Socioeconomic History    Marital status:    Tobacco Use    Smoking status: Never Smoker    Smokeless tobacco: Never Used       Chief Complaint:   Chief Complaint   Patient presents with    Follow-up     LEFT KNEE LAST INJECT 6/21/22 STATES IT HELPED FOR A FEW DAYS, THAN IT STARTED AGAIN.  SOME DAY THE PAIN WILL RADIATE TO THE BACK OR BELOW THE KNEE CAP. DENIES FALL/INJURY        Consulting Physician: No ref. provider found      Review of Systems:    All review of systems negative except for those stated in the HPI    Examination:    Vital Signs:    Vitals:    07/28/22 0800   Weight: 99.8 kg (220 lb)   Height: 5' 4" (1.626 m)       Body mass index is 37.76 kg/m².    Physical Exam:   General: Well-developed, well-nourished.  Neuro: Alert and oriented x 3.  Psych: Normal mood and affect.  Left Knee Exam: No obvious deformity. Range of motion from 0-115 degrees. Negative patella grind and equal subluxation of knee cap medial and lateral < 1cm. Negative patella tendon tenderness. Negative Lachman and anterior drawer test. Negative posterior drawer test. Negative varus and valgus stress test. Negative medial joint line tenderness. Negative lateral joint line tenderness. 5/5 strength and normal skin appearance. Sensibility normal         Assessment: Pes anserinus bursitis of left knee  -     diclofenac (VOLTAREN) 50 MG EC tablet; Take 1 tablet (50 mg total) by mouth 2 (two) times daily as needed (pain). Take with food  Dispense: 60 tablet; Refill: 0    Obesities, morbid        Plan:  We discussed multiple options including continued conservative treatment versus advanced imaging of her left knee.  She would like to continue conservatively for now.  Prescription routed for diclofenac to be taken as needed for pain.  We had a thorough discussion about anti-inflammatory medications and their potential to cause some gastrointestinal distress or bleeding. Advised that the patient take this medication " with food or a medication to protect their stomach (i.e. omeprazole, Prilosec, Prevacid, Pepcid, Nexium, pantoprazole, etc.). Advised that if the patient experiences any type of gastrointestinal symptoms, she should stop taking the medication.  I will also place her on a home exercise program for her left knee.  She will return to clinic in approximately 4 months for re-evaluation.  She verbalized understanding of the plan of care with no further questions.         Clinical Reference Documents Added to Patient Instructions       Document    PES ANSERINE BURSITIS (ENGLISH)            Follow up in about 4 months (around 11/28/2022) for Reevaluation.          DISCLAIMER: This note may have been dictated using voice recognition software and may contain grammatical errors.     NOTE: Consult report sent to referring provider via [employer] EMR.

## 2022-08-12 ENCOUNTER — HOSPITAL ENCOUNTER (OUTPATIENT)
Dept: RADIOLOGY | Facility: HOSPITAL | Age: 57
Discharge: HOME | End: 2022-08-12
Attending: OBSTETRICS & GYNECOLOGY
Payer: COMMERCIAL

## 2022-08-12 DIAGNOSIS — Z12.31 BREAST CANCER SCREENING BY MAMMOGRAM: ICD-10-CM

## 2022-08-12 PROCEDURE — 77063 BREAST TOMOSYNTHESIS BI: CPT | Mod: 26,,, | Performed by: RADIOLOGY

## 2022-08-12 PROCEDURE — 77063 MAMMO DIGITAL SCREENING BILAT WITH TOMO: ICD-10-PCS | Mod: 26,,, | Performed by: RADIOLOGY

## 2022-08-12 PROCEDURE — 77067 SCR MAMMO BI INCL CAD: CPT | Mod: TC

## 2022-08-12 PROCEDURE — 77067 MAMMO DIGITAL SCREENING BILAT WITH TOMO: ICD-10-PCS | Mod: 26,,, | Performed by: RADIOLOGY

## 2022-08-12 PROCEDURE — 77067 SCR MAMMO BI INCL CAD: CPT | Mod: 26,,, | Performed by: RADIOLOGY

## 2022-09-30 ENCOUNTER — IMMUNIZATION (OUTPATIENT)
Dept: FAMILY MEDICINE | Facility: CLINIC | Age: 57
End: 2022-09-30
Payer: COMMERCIAL

## 2022-09-30 PROCEDURE — 90686 IIV4 VACC NO PRSV 0.5 ML IM: CPT | Mod: S$GLB,,, | Performed by: INTERNAL MEDICINE

## 2022-09-30 PROCEDURE — 90471 IMMUNIZATION ADMIN: CPT | Mod: S$GLB,,, | Performed by: INTERNAL MEDICINE

## 2022-09-30 PROCEDURE — 90471 FLU VACCINE (QUAD) GREATER THAN OR EQUAL TO 3YO PRESERVATIVE FREE IM: ICD-10-PCS | Mod: S$GLB,,, | Performed by: INTERNAL MEDICINE

## 2022-09-30 PROCEDURE — 90686 FLU VACCINE (QUAD) GREATER THAN OR EQUAL TO 3YO PRESERVATIVE FREE IM: ICD-10-PCS | Mod: S$GLB,,, | Performed by: INTERNAL MEDICINE

## 2022-10-28 ENCOUNTER — DOCUMENTATION ONLY (OUTPATIENT)
Dept: ADMINISTRATIVE | Facility: HOSPITAL | Age: 57
End: 2022-10-28
Payer: COMMERCIAL

## 2023-04-13 ENCOUNTER — PATIENT MESSAGE (OUTPATIENT)
Dept: INTERNAL MEDICINE | Facility: CLINIC | Age: 58
End: 2023-04-13
Payer: COMMERCIAL

## 2023-04-13 ENCOUNTER — TELEPHONE (OUTPATIENT)
Dept: INTERNAL MEDICINE | Facility: CLINIC | Age: 58
End: 2023-04-13
Payer: COMMERCIAL

## 2023-04-13 DIAGNOSIS — Z86.010 HISTORY OF COLON POLYPS: Primary | ICD-10-CM

## 2023-08-18 ENCOUNTER — HOSPITAL ENCOUNTER (OUTPATIENT)
Dept: RADIOLOGY | Facility: HOSPITAL | Age: 58
Discharge: HOME | End: 2023-08-18
Attending: INTERNAL MEDICINE
Payer: COMMERCIAL

## 2023-08-18 DIAGNOSIS — Z12.31 VISIT FOR SCREENING MAMMOGRAM: ICD-10-CM

## 2023-08-18 PROCEDURE — 77067 SCR MAMMO BI INCL CAD: CPT | Mod: TC

## 2023-08-18 PROCEDURE — 77063 BREAST TOMOSYNTHESIS BI: CPT | Mod: 26,,, | Performed by: RADIOLOGY

## 2023-08-18 PROCEDURE — 77067 MAMMO DIGITAL SCREENING BILAT WITH TOMO: ICD-10-PCS | Mod: 26,,, | Performed by: RADIOLOGY

## 2023-08-18 PROCEDURE — 77063 MAMMO DIGITAL SCREENING BILAT WITH TOMO: ICD-10-PCS | Mod: 26,,, | Performed by: RADIOLOGY

## 2023-08-18 PROCEDURE — 77067 SCR MAMMO BI INCL CAD: CPT | Mod: 26,,, | Performed by: RADIOLOGY

## 2023-09-26 ENCOUNTER — DOCUMENTATION ONLY (OUTPATIENT)
Dept: INTERNAL MEDICINE | Facility: CLINIC | Age: 58
End: 2023-09-26
Payer: COMMERCIAL

## 2023-09-26 LAB — CRC RECOMMENDATION EXT: NORMAL

## 2023-11-02 DIAGNOSIS — Z00.00 WELL ADULT EXAM: Primary | ICD-10-CM

## 2023-11-03 ENCOUNTER — LAB VISIT (OUTPATIENT)
Dept: LAB | Facility: HOSPITAL | Age: 58
End: 2023-11-03
Attending: INTERNAL MEDICINE
Payer: COMMERCIAL

## 2023-11-03 DIAGNOSIS — Z00.00 WELL ADULT EXAM: ICD-10-CM

## 2023-11-03 LAB
ALBUMIN SERPL-MCNC: 3.6 G/DL (ref 3.5–5)
ALBUMIN/GLOB SERPL: 1.2 RATIO (ref 1.1–2)
ALP SERPL-CCNC: 61 UNIT/L (ref 40–150)
ALT SERPL-CCNC: 13 UNIT/L (ref 0–55)
APPEARANCE UR: CLEAR
AST SERPL-CCNC: 19 UNIT/L (ref 5–34)
BACTERIA #/AREA URNS AUTO: ABNORMAL /HPF
BASOPHILS # BLD AUTO: 0.02 X10(3)/MCL
BASOPHILS NFR BLD AUTO: 0.3 %
BILIRUB SERPL-MCNC: 1 MG/DL
BILIRUB UR QL STRIP.AUTO: ABNORMAL
BUN SERPL-MCNC: 9.4 MG/DL (ref 9.8–20.1)
CALCIUM SERPL-MCNC: 9.6 MG/DL (ref 8.4–10.2)
CHLORIDE SERPL-SCNC: 106 MMOL/L (ref 98–107)
CHOLEST SERPL-MCNC: 191 MG/DL
CHOLEST/HDLC SERPL: 3 {RATIO} (ref 0–5)
CO2 SERPL-SCNC: 28 MMOL/L (ref 22–29)
COLOR UR AUTO: YELLOW
CREAT SERPL-MCNC: 0.81 MG/DL (ref 0.55–1.02)
EOSINOPHIL # BLD AUTO: 0.28 X10(3)/MCL (ref 0–0.9)
EOSINOPHIL NFR BLD AUTO: 4.8 %
ERYTHROCYTE [DISTWIDTH] IN BLOOD BY AUTOMATED COUNT: 14.1 % (ref 11.5–17)
GFR SERPLBLD CREATININE-BSD FMLA CKD-EPI: >60 MLS/MIN/1.73/M2
GLOBULIN SER-MCNC: 2.9 GM/DL (ref 2.4–3.5)
GLUCOSE SERPL-MCNC: 88 MG/DL (ref 74–100)
GLUCOSE UR QL STRIP.AUTO: NEGATIVE
HCT VFR BLD AUTO: 44.4 % (ref 37–47)
HDLC SERPL-MCNC: 65 MG/DL (ref 35–60)
HGB BLD-MCNC: 14.3 G/DL (ref 12–16)
IMM GRANULOCYTES # BLD AUTO: 0.01 X10(3)/MCL (ref 0–0.04)
IMM GRANULOCYTES NFR BLD AUTO: 0.2 %
KETONES UR QL STRIP.AUTO: ABNORMAL
LDLC SERPL CALC-MCNC: 108 MG/DL (ref 50–140)
LEUKOCYTE ESTERASE UR QL STRIP.AUTO: ABNORMAL
LYMPHOCYTES # BLD AUTO: 2.3 X10(3)/MCL (ref 0.6–4.6)
LYMPHOCYTES NFR BLD AUTO: 39.7 %
MCH RBC QN AUTO: 31.5 PG (ref 27–31)
MCHC RBC AUTO-ENTMCNC: 32.2 G/DL (ref 33–36)
MCV RBC AUTO: 97.8 FL (ref 80–94)
MONOCYTES # BLD AUTO: 0.56 X10(3)/MCL (ref 0.1–1.3)
MONOCYTES NFR BLD AUTO: 9.7 %
NEUTROPHILS # BLD AUTO: 2.62 X10(3)/MCL (ref 2.1–9.2)
NEUTROPHILS NFR BLD AUTO: 45.3 %
NITRITE UR QL STRIP.AUTO: NEGATIVE
PH UR STRIP.AUTO: 5.5 [PH]
PLATELET # BLD AUTO: 230 X10(3)/MCL (ref 130–400)
PMV BLD AUTO: 10 FL (ref 7.4–10.4)
POTASSIUM SERPL-SCNC: 3.6 MMOL/L (ref 3.5–5.1)
PROT SERPL-MCNC: 6.5 GM/DL (ref 6.4–8.3)
PROT UR QL STRIP.AUTO: 30
RBC # BLD AUTO: 4.54 X10(6)/MCL (ref 4.2–5.4)
RBC #/AREA URNS AUTO: ABNORMAL /HPF
RBC UR QL AUTO: ABNORMAL
SODIUM SERPL-SCNC: 143 MMOL/L (ref 136–145)
SP GR UR STRIP.AUTO: >=1.03 (ref 1–1.03)
SQUAMOUS #/AREA URNS AUTO: ABNORMAL /HPF
TRIGL SERPL-MCNC: 90 MG/DL (ref 37–140)
TSH SERPL-ACNC: 2.59 UIU/ML (ref 0.35–4.94)
UROBILINOGEN UR STRIP-ACNC: 0.2
VLDLC SERPL CALC-MCNC: 18 MG/DL
WBC # SPEC AUTO: 5.79 X10(3)/MCL (ref 4.5–11.5)
WBC #/AREA URNS AUTO: ABNORMAL /HPF

## 2023-11-03 PROCEDURE — 80061 LIPID PANEL: CPT

## 2023-11-03 PROCEDURE — 81001 URINALYSIS AUTO W/SCOPE: CPT

## 2023-11-03 PROCEDURE — 36415 COLL VENOUS BLD VENIPUNCTURE: CPT

## 2023-11-03 PROCEDURE — 80053 COMPREHEN METABOLIC PANEL: CPT

## 2023-11-03 PROCEDURE — 85025 COMPLETE CBC W/AUTO DIFF WBC: CPT

## 2023-11-03 PROCEDURE — 87086 URINE CULTURE/COLONY COUNT: CPT

## 2023-11-03 PROCEDURE — 84443 ASSAY THYROID STIM HORMONE: CPT

## 2023-11-05 LAB — BACTERIA UR CULT: NORMAL

## 2023-11-09 ENCOUNTER — OFFICE VISIT (OUTPATIENT)
Dept: INTERNAL MEDICINE | Facility: CLINIC | Age: 58
End: 2023-11-09
Payer: COMMERCIAL

## 2023-11-09 VITALS
OXYGEN SATURATION: 96 % | TEMPERATURE: 98 F | SYSTOLIC BLOOD PRESSURE: 122 MMHG | BODY MASS INDEX: 40.63 KG/M2 | HEART RATE: 66 BPM | WEIGHT: 238 LBS | HEIGHT: 64 IN | DIASTOLIC BLOOD PRESSURE: 80 MMHG | RESPIRATION RATE: 16 BRPM

## 2023-11-09 DIAGNOSIS — E66.01 SEVERE OBESITY: ICD-10-CM

## 2023-11-09 DIAGNOSIS — Z00.00 WELLNESS EXAMINATION: Primary | ICD-10-CM

## 2023-11-09 PROCEDURE — 3008F PR BODY MASS INDEX (BMI) DOCUMENTED: ICD-10-PCS | Mod: CPTII,,, | Performed by: INTERNAL MEDICINE

## 2023-11-09 PROCEDURE — 3074F PR MOST RECENT SYSTOLIC BLOOD PRESSURE < 130 MM HG: ICD-10-PCS | Mod: CPTII,,, | Performed by: INTERNAL MEDICINE

## 2023-11-09 PROCEDURE — 1160F RVW MEDS BY RX/DR IN RCRD: CPT | Mod: CPTII,,, | Performed by: INTERNAL MEDICINE

## 2023-11-09 PROCEDURE — 3079F PR MOST RECENT DIASTOLIC BLOOD PRESSURE 80-89 MM HG: ICD-10-PCS | Mod: CPTII,,, | Performed by: INTERNAL MEDICINE

## 2023-11-09 PROCEDURE — 3079F DIAST BP 80-89 MM HG: CPT | Mod: CPTII,,, | Performed by: INTERNAL MEDICINE

## 2023-11-09 PROCEDURE — 1159F MED LIST DOCD IN RCRD: CPT | Mod: CPTII,,, | Performed by: INTERNAL MEDICINE

## 2023-11-09 PROCEDURE — 1159F PR MEDICATION LIST DOCUMENTED IN MEDICAL RECORD: ICD-10-PCS | Mod: CPTII,,, | Performed by: INTERNAL MEDICINE

## 2023-11-09 PROCEDURE — 3008F BODY MASS INDEX DOCD: CPT | Mod: CPTII,,, | Performed by: INTERNAL MEDICINE

## 2023-11-09 PROCEDURE — 3074F SYST BP LT 130 MM HG: CPT | Mod: CPTII,,, | Performed by: INTERNAL MEDICINE

## 2023-11-09 PROCEDURE — 99396 PREV VISIT EST AGE 40-64: CPT | Mod: ,,, | Performed by: INTERNAL MEDICINE

## 2023-11-09 PROCEDURE — 1160F PR REVIEW ALL MEDS BY PRESCRIBER/CLIN PHARMACIST DOCUMENTED: ICD-10-PCS | Mod: CPTII,,, | Performed by: INTERNAL MEDICINE

## 2023-11-09 PROCEDURE — 99396 PR PREVENTIVE VISIT,EST,40-64: ICD-10-PCS | Mod: ,,, | Performed by: INTERNAL MEDICINE

## 2023-11-09 RX ORDER — TIRZEPATIDE 7.5 MG/.5ML
5 INJECTION, SOLUTION SUBCUTANEOUS
COMMUNITY

## 2023-11-09 NOTE — PROGRESS NOTES
"Subjective:       Patient ID: Trista Nielsen is a 58 y.o. female.    Chief Complaint: Annual Exam    58-year-old female is here for annual wellness exam.  She was last seen in 2021      Review of Systems   Constitutional:  Negative for fever.   HENT:  Negative for nosebleeds.    Eyes:  Negative for visual disturbance.   Respiratory:  Negative for shortness of breath.    Cardiovascular:  Negative for chest pain.   Gastrointestinal:  Negative for abdominal pain.   Genitourinary:  Negative for dysuria.   Musculoskeletal:  Negative for gait problem.   Neurological:  Negative for headaches.         Objective:      Physical Exam  Constitutional:       Appearance: She is obese.   HENT:      Head: Normocephalic.      Mouth/Throat:      Pharynx: Oropharynx is clear.   Eyes:      Extraocular Movements: Extraocular movements intact.   Cardiovascular:      Rate and Rhythm: Normal rate and regular rhythm.   Pulmonary:      Breath sounds: Normal breath sounds.   Abdominal:      Palpations: Abdomen is soft.   Musculoskeletal:         General: No swelling.   Skin:     General: Skin is warm.   Neurological:      General: No focal deficit present.      Mental Status: She is alert and oriented to person, place, and time.   Psychiatric:         Mood and Affect: Mood normal.         Vitals:    11/09/23 1447   BP: 122/80   Pulse: 66   Resp: 16   Temp: 98.3 °F (36.8 °C)   SpO2: 96%   Weight: 108 kg (238 lb)   Height: 5' 4" (1.626 m)      Assessment:       Problem List Items Addressed This Visit          Endocrine    Severe obesity    Relevant Orders    CBC Auto Differential    Comprehensive Metabolic Panel    Lipid Panel    Urinalysis, Reflex to Urine Culture    TSH       Other    Wellness examination - Primary    Relevant Orders    CBC Auto Differential    Comprehensive Metabolic Panel    Lipid Panel    Urinalysis, Reflex to Urine Culture    TSH       Medication List with Changes/Refills   Current Medications    CETIRIZINE (ZYRTEC) 10 MG " CAP        CITALOPRAM (CELEXA) 40 MG TABLET    TAKE 1 TABLET BY MOUTH ONCE A DAY    TIRZEPATIDE (MOUNJARO) 7.5 MG/0.5 ML PNIJ    Inject 5 mg into the skin every 7 days.        Plan:       1. Mild anxiety: Continue citalopram     2. Seasonal allergies: Continue Zyrtec     3. Vitamin D deficiency: Continue 5000 units daily     4. Severe obesity:  On Mounjaro for 2 months with 20 pounds lost so far, continue dietary changes     5. Family history of heart disease: Calcium score of 0 in 2019     6. Wellness: Colonoscopy 9/2023, repeat 5 years. Mammogram 8/2023, followed by Dr. Chavez. Order bone density        She is  and has one daughter. 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

## 2023-11-17 ENCOUNTER — HOSPITAL ENCOUNTER (OUTPATIENT)
Dept: RADIOLOGY | Facility: HOSPITAL | Age: 58
Discharge: HOME | End: 2023-11-17
Attending: INTERNAL MEDICINE
Payer: COMMERCIAL

## 2023-11-17 DIAGNOSIS — Z00.00 WELLNESS EXAMINATION: ICD-10-CM

## 2023-11-17 PROCEDURE — 77080 DXA BONE DENSITY AXIAL SKELETON 1 OR MORE SITES: ICD-10-PCS | Mod: 26,,, | Performed by: RADIOLOGY

## 2023-11-17 PROCEDURE — 77080 DXA BONE DENSITY AXIAL: CPT | Mod: 26,,, | Performed by: RADIOLOGY

## 2023-11-17 PROCEDURE — 77080 DXA BONE DENSITY AXIAL: CPT | Mod: TC

## 2024-01-03 ENCOUNTER — PATIENT MESSAGE (OUTPATIENT)
Dept: INTERNAL MEDICINE | Facility: CLINIC | Age: 59
End: 2024-01-03
Payer: COMMERCIAL

## 2024-01-03 DIAGNOSIS — Z00.00 WELLNESS EXAMINATION: Primary | ICD-10-CM

## 2024-01-03 RX ORDER — DICLOFENAC SODIUM 50 MG/1
50 TABLET, DELAYED RELEASE ORAL 2 TIMES DAILY PRN
Qty: 30 TABLET | Refills: 0 | Status: SHIPPED | OUTPATIENT
Start: 2024-01-03

## 2024-02-12 PROBLEM — Z00.00 WELLNESS EXAMINATION: Status: RESOLVED | Noted: 2023-11-09 | Resolved: 2024-02-12

## 2024-02-18 DIAGNOSIS — Z00.00 WELL ADULT EXAM: ICD-10-CM

## 2024-02-19 RX ORDER — CITALOPRAM 40 MG/1
TABLET, FILM COATED ORAL
Qty: 90 TABLET | Refills: 3 | Status: SHIPPED | OUTPATIENT
Start: 2024-02-19

## 2024-03-25 ENCOUNTER — ON-DEMAND VIRTUAL (OUTPATIENT)
Dept: URGENT CARE | Facility: CLINIC | Age: 59
End: 2024-03-25
Payer: COMMERCIAL

## 2024-03-25 DIAGNOSIS — T30.0 BURN: Primary | ICD-10-CM

## 2024-03-25 PROCEDURE — 99203 OFFICE O/P NEW LOW 30 MIN: CPT | Mod: CC,95,, | Performed by: NURSE PRACTITIONER

## 2024-03-25 RX ORDER — SILVER SULFADIAZINE 10 G/1000G
CREAM TOPICAL 2 TIMES DAILY
Qty: 50 G | Refills: 1 | Status: SHIPPED | OUTPATIENT
Start: 2024-03-25 | End: 2024-04-08
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
## 2024-03-25 NOTE — PATIENT INSTRUCTIONS
1.  Take all medications as directed. If you have been prescribed antibiotics, make sure to complete them.   2.  Rest and keep yourself/patient well hydrated. For adults, it is recommended to drink at least 8-10 glasses of water daily.   3.  For patients above 6 months of age who are not allergic to and are not on anticoagulants, you can alternate Tylenol and Motrin every 4-6 hours for fever above 100.4F and/or pain.  For patients less than 6 months of age, allergic to or intolerant to NSAIDS, have gastritis, gastric ulcers, or history of GI bleeds, are pregnant, or are on anticoagulant therapy, you can take Tylenol every 4 hours as needed for fever above 100.4F and/or pain.   4. You should schedule a follow-up appointment with your Primary Care Provider/Pediatrician for recheck in 2-3 days or as directed at this visit.   5.  If your condition fails to improve in a timely manner, you should receive another evaluation by your Primary Care Provider/Pediatrician to discuss your concerns or return to urgent care for a recheck.  If your condition worsens at any time, you should report immediately to your nearest Emergency Department for further evaluation. **You must understand that you have received Urgent Care treatment only and that you may be released before all of your medical problems are known or treated. You, the patient, are responsible to arrange for follow-up care as instructed.     Patient Education       Taking Care of Yourself After You Have a Baby   About this topic   It is important to take care of yourself after you have a baby. This will help you cope with the stress of having a new baby. Parents who are well rested often feel like they are better able to care for their baby.  General   New moms, and dads too, often get less sleep after the baby is born and the sleep they do get is often interrupted. When you do not get enough good sleep, you are more likely to:  Be irritable  Have problems thinking and  concentrating  Become depressed  Be less alert for tasks like driving  Forget important things like appointments  It is also important for new parents to take a few minutes each day to take care of themselves. It can be hard to find a few minutes to put yourself first. When you do, you will find that you are better able to take care of your baby and family.  What lifestyle changes are needed?   Ask for help. When others offer to help, say yes. Use that time to take a nap or to do something nice for yourself. Take a long shower. Paint your nails. Go for a walk. Have a cup of coffee with a friend.  Remember it is healthy to take time for yourself. When you allow other trusted friends or relatives to watch your baby, they get a chance to bond with your baby. You get a chance to get away for a short time and focus on other things.  Most often, after a little time away from your baby, you will come back refreshed and recharged.  Take time to connect with your partner as well. Being a parent is hard, but it is easier if your partner is involved.  Make sleep a priority. Take a nap when the baby sleeps. The dishes or housework can wait. Sleep is important to your overall health and to your mental health as well.  Work out a schedule with a partner or family member. Try to share nighttime care.  If you are having trouble sleeping at night, try these tips to help you sleep:  Spend some time in bright light or sunlight during the day. Avoid bright lights and TV, phone, and computer screens at night.  Avoid hard exercise, coffee, and heavy meals for a few hours before bedtime.  Keep your bedroom dark, cool, and quiet.  If you cannot fall asleep, get up and do a light activity until you feel sleepy. Try to read, write in a journal, or take deep and relaxing breaths.  When do I need to call the doctor?   If you have any thoughts of hurting yourself or someone else  If you have depression and it gets worse or is not getting  better  If your problems with sleeping continue  If you are not able to do your normal tasks  Where can I learn more?   [employer] of [employer]  http://www.marchofdimes.org/pregnancy/postpartum-care   [employer]  https://www.nhs.uk/conditions/pregnancy-and-baby/sleep-and-tiredness/   Womens Health  https://www.womenshealth.gov/pregnancy/childbirth-and-beyond/recovering-birth   Last Reviewed Date   2020-01-14  Consumer Information Use and Disclaimer   This information is not specific medical advice and does not replace information you receive from your health care provider. This is only a brief summary of general information. It does NOT include all information about conditions, illnesses, injuries, tests, procedures, treatments, therapies, discharge instructions or life-style choices that may apply to you. You must talk with your health care provider for complete information about your health and treatment options. This information should not be used to decide whether or not to accept your health care providers advice, instructions or recommendations. Only your health care provider has the knowledge and training to provide advice that is right for you.  Copyright   Copyright © 2021 UpToDate, Inc. and its affiliates and/or licensors. All rights reserved.

## 2024-06-17 ENCOUNTER — PATIENT MESSAGE (OUTPATIENT)
Dept: INTERNAL MEDICINE | Facility: CLINIC | Age: 59
End: 2024-06-17
Payer: COMMERCIAL

## 2024-06-17 DIAGNOSIS — Z12.31 VISIT FOR SCREENING MAMMOGRAM: Primary | ICD-10-CM

## 2024-08-23 ENCOUNTER — HOSPITAL ENCOUNTER (OUTPATIENT)
Dept: RADIOLOGY | Facility: HOSPITAL | Age: 59
Discharge: HOME | End: 2024-08-23
Attending: INTERNAL MEDICINE
Payer: COMMERCIAL

## 2024-08-23 DIAGNOSIS — Z12.31 ENCOUNTER FOR SCREENING MAMMOGRAM FOR BREAST CANCER: ICD-10-CM

## 2024-08-23 PROCEDURE — 77063 BREAST TOMOSYNTHESIS BI: CPT | Mod: 26,,, | Performed by: RADIOLOGY

## 2024-08-23 PROCEDURE — 77067 SCR MAMMO BI INCL CAD: CPT | Mod: TC

## 2024-08-23 PROCEDURE — 77063 BREAST TOMOSYNTHESIS BI: CPT | Mod: TC

## 2024-08-23 PROCEDURE — 77067 SCR MAMMO BI INCL CAD: CPT | Mod: 26,,, | Performed by: RADIOLOGY

## 2024-11-08 ENCOUNTER — LAB VISIT (OUTPATIENT)
Dept: LAB | Facility: HOSPITAL | Age: 59
End: 2024-11-08
Attending: INTERNAL MEDICINE
Payer: COMMERCIAL

## 2024-11-08 DIAGNOSIS — Z00.00 WELLNESS EXAMINATION: ICD-10-CM

## 2024-11-08 DIAGNOSIS — E66.01 SEVERE OBESITY: ICD-10-CM

## 2024-11-08 LAB
ALBUMIN SERPL-MCNC: 3.9 G/DL (ref 3.5–5)
ALBUMIN/GLOB SERPL: 1.1 RATIO (ref 1.1–2)
ALP SERPL-CCNC: 79 UNIT/L (ref 40–150)
ALT SERPL-CCNC: 13 UNIT/L (ref 0–55)
ANION GAP SERPL CALC-SCNC: 9 MEQ/L
AST SERPL-CCNC: 20 UNIT/L (ref 5–34)
BACTERIA #/AREA URNS AUTO: NORMAL /HPF
BASOPHILS # BLD AUTO: 0.06 X10(3)/MCL
BASOPHILS NFR BLD AUTO: 0.9 %
BILIRUB SERPL-MCNC: 0.8 MG/DL
BILIRUB UR QL STRIP.AUTO: NEGATIVE
BUN SERPL-MCNC: 8.6 MG/DL (ref 9.8–20.1)
CALCIUM SERPL-MCNC: 9.6 MG/DL (ref 8.4–10.2)
CHLORIDE SERPL-SCNC: 106 MMOL/L (ref 98–107)
CHOLEST SERPL-MCNC: 241 MG/DL
CHOLEST/HDLC SERPL: 3 {RATIO} (ref 0–5)
CLARITY UR: CLEAR
CO2 SERPL-SCNC: 27 MMOL/L (ref 22–29)
COLOR UR AUTO: ABNORMAL
CREAT SERPL-MCNC: 0.75 MG/DL (ref 0.55–1.02)
CREAT/UREA NIT SERPL: 11
EOSINOPHIL # BLD AUTO: 0.27 X10(3)/MCL (ref 0–0.9)
EOSINOPHIL NFR BLD AUTO: 4.2 %
ERYTHROCYTE [DISTWIDTH] IN BLOOD BY AUTOMATED COUNT: 13.3 % (ref 11.5–17)
GFR SERPLBLD CREATININE-BSD FMLA CKD-EPI: >60 ML/MIN/1.73/M2
GLOBULIN SER-MCNC: 3.7 GM/DL (ref 2.4–3.5)
GLUCOSE SERPL-MCNC: 91 MG/DL (ref 74–100)
GLUCOSE UR QL STRIP: NEGATIVE
HCT VFR BLD AUTO: 45.4 % (ref 37–47)
HDLC SERPL-MCNC: 76 MG/DL (ref 35–60)
HGB BLD-MCNC: 15.2 G/DL (ref 12–16)
HGB UR QL STRIP: NEGATIVE
IMM GRANULOCYTES # BLD AUTO: 0.01 X10(3)/MCL (ref 0–0.04)
IMM GRANULOCYTES NFR BLD AUTO: 0.2 %
KETONES UR QL STRIP: NEGATIVE
LDLC SERPL CALC-MCNC: 149 MG/DL (ref 50–140)
LEUKOCYTE ESTERASE UR QL STRIP: ABNORMAL
LYMPHOCYTES # BLD AUTO: 2.5 X10(3)/MCL (ref 0.6–4.6)
LYMPHOCYTES NFR BLD AUTO: 39.3 %
MCH RBC QN AUTO: 31.7 PG (ref 27–31)
MCHC RBC AUTO-ENTMCNC: 33.5 G/DL (ref 33–36)
MCV RBC AUTO: 94.6 FL (ref 80–94)
MONOCYTES # BLD AUTO: 0.66 X10(3)/MCL (ref 0.1–1.3)
MONOCYTES NFR BLD AUTO: 10.4 %
NEUTROPHILS # BLD AUTO: 2.86 X10(3)/MCL (ref 2.1–9.2)
NEUTROPHILS NFR BLD AUTO: 45 %
NITRITE UR QL STRIP: NEGATIVE
NRBC BLD AUTO-RTO: 0 %
PH UR STRIP: 7.5 [PH]
PLATELET # BLD AUTO: 243 X10(3)/MCL (ref 130–400)
PMV BLD AUTO: 9.4 FL (ref 7.4–10.4)
POTASSIUM SERPL-SCNC: 4.1 MMOL/L (ref 3.5–5.1)
PROT SERPL-MCNC: 7.6 GM/DL (ref 6.4–8.3)
PROT UR QL STRIP: NEGATIVE
RBC # BLD AUTO: 4.8 X10(6)/MCL (ref 4.2–5.4)
RBC #/AREA URNS AUTO: NORMAL /HPF
SODIUM SERPL-SCNC: 142 MMOL/L (ref 136–145)
SP GR UR STRIP.AUTO: 1.01 (ref 1–1.03)
SQUAMOUS #/AREA URNS AUTO: NORMAL /HPF
TRIGL SERPL-MCNC: 79 MG/DL (ref 37–140)
TSH SERPL-ACNC: 2.25 UIU/ML (ref 0.35–4.94)
UROBILINOGEN UR STRIP-ACNC: 0.2
VLDLC SERPL CALC-MCNC: 16 MG/DL
WBC # BLD AUTO: 6.36 X10(3)/MCL (ref 4.5–11.5)
WBC #/AREA URNS AUTO: NORMAL /HPF

## 2024-11-08 PROCEDURE — 85025 COMPLETE CBC W/AUTO DIFF WBC: CPT

## 2024-11-08 PROCEDURE — 80061 LIPID PANEL: CPT

## 2024-11-08 PROCEDURE — 81003 URINALYSIS AUTO W/O SCOPE: CPT

## 2024-11-08 PROCEDURE — 80053 COMPREHEN METABOLIC PANEL: CPT

## 2024-11-08 PROCEDURE — 36415 COLL VENOUS BLD VENIPUNCTURE: CPT

## 2024-11-08 PROCEDURE — 84443 ASSAY THYROID STIM HORMONE: CPT

## 2024-11-12 NOTE — PROGRESS NOTES
"Subjective:       Patient ID: Trista Nielsen is a 59 y.o. female.    Chief Complaint: Annual Exam    59-year-old female is here for annual wellness exam.       Review of Systems   Constitutional:  Negative for fever.   HENT:  Negative for nosebleeds.    Eyes:  Negative for visual disturbance.   Respiratory:  Negative for shortness of breath.    Cardiovascular:  Negative for chest pain.   Gastrointestinal:  Negative for abdominal pain.   Genitourinary:  Negative for dysuria.   Musculoskeletal:  Negative for gait problem.   Neurological:  Negative for headaches.         Objective:      Physical Exam  Constitutional:       Appearance: She is obese.   HENT:      Head: Normocephalic.      Mouth/Throat:      Pharynx: Oropharynx is clear.   Eyes:      Extraocular Movements: Extraocular movements intact.   Cardiovascular:      Rate and Rhythm: Normal rate and regular rhythm.   Pulmonary:      Breath sounds: Normal breath sounds.   Abdominal:      Palpations: Abdomen is soft.   Musculoskeletal:         General: No swelling.   Skin:     General: Skin is warm.   Neurological:      General: No focal deficit present.      Mental Status: She is alert and oriented to person, place, and time.   Psychiatric:         Mood and Affect: Mood normal.         Vitals:    11/13/24 0826   BP: 128/82   Pulse: 70   Resp: 16   Temp: 98.2 °F (36.8 °C)   SpO2: 97%   Weight: 95.3 kg (210 lb)   Height: 5' 4" (1.626 m)        Assessment:       Problem List Items Addressed This Visit       RESOLVED: Wellness examination - Primary    Severe obesity    Dyslipidemia       Medication List with Changes/Refills   Current Medications    CELEXA 40 MG TABLET    TAKE 1 TABLET BY MOUTH ONCE A DAY    CETIRIZINE (ZYRTEC) 10 MG CAP        TIRZEPATIDE (MOUNJARO) 7.5 MG/0.5 ML PNIJ    Inject 5 mg into the skin every 7 days.   Discontinued Medications    DICLOFENAC (VOLTAREN) 50 MG EC TABLET    Take 1 tablet (50 mg total) by mouth 2 (two) times daily as needed (pain). "        Plan:       1. Mild anxiety: Continue citalopram     2. Seasonal allergies: Continue Zyrtec     3. Vitamin D deficiency: Continue 5000 units daily     4. Severe obesity:  On Mounjaro, lost over 50 lb in the past year     5. Family history of heart disease: Calcium score of 0 in 2019, repeat     6. Osteopenia: Bone density 11/2023    7. Dyslipidemia: Monitor, repeat Ca score    8. Wellness: Colonoscopy 9/2023, repeat 5 years. Mammogram 8/2024, followed by Dr. Chavez.         She is  and has one daughter. 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

## 2024-11-13 ENCOUNTER — OFFICE VISIT (OUTPATIENT)
Dept: INTERNAL MEDICINE | Facility: CLINIC | Age: 59
End: 2024-11-13
Payer: COMMERCIAL

## 2024-11-13 VITALS
OXYGEN SATURATION: 97 % | DIASTOLIC BLOOD PRESSURE: 82 MMHG | BODY MASS INDEX: 35.85 KG/M2 | TEMPERATURE: 98 F | HEART RATE: 70 BPM | RESPIRATION RATE: 16 BRPM | WEIGHT: 210 LBS | SYSTOLIC BLOOD PRESSURE: 128 MMHG | HEIGHT: 64 IN

## 2024-11-13 DIAGNOSIS — Z00.00 WELLNESS EXAMINATION: Primary | ICD-10-CM

## 2024-11-13 DIAGNOSIS — E78.5 DYSLIPIDEMIA: ICD-10-CM

## 2024-11-13 DIAGNOSIS — E66.01 SEVERE OBESITY: ICD-10-CM

## 2024-11-13 PROCEDURE — 1159F MED LIST DOCD IN RCRD: CPT | Mod: CPTII,,, | Performed by: INTERNAL MEDICINE

## 2024-11-13 PROCEDURE — 3008F BODY MASS INDEX DOCD: CPT | Mod: CPTII,,, | Performed by: INTERNAL MEDICINE

## 2024-11-13 PROCEDURE — 3079F DIAST BP 80-89 MM HG: CPT | Mod: CPTII,,, | Performed by: INTERNAL MEDICINE

## 2024-11-13 PROCEDURE — 3074F SYST BP LT 130 MM HG: CPT | Mod: CPTII,,, | Performed by: INTERNAL MEDICINE

## 2024-11-13 PROCEDURE — 99396 PREV VISIT EST AGE 40-64: CPT | Mod: ,,, | Performed by: INTERNAL MEDICINE

## 2024-11-13 PROCEDURE — 1160F RVW MEDS BY RX/DR IN RCRD: CPT | Mod: CPTII,,, | Performed by: INTERNAL MEDICINE

## 2024-11-26 ENCOUNTER — HOSPITAL ENCOUNTER (OUTPATIENT)
Dept: RADIOLOGY | Facility: HOSPITAL | Age: 59
Discharge: HOME | End: 2024-11-26
Attending: INTERNAL MEDICINE
Payer: COMMERCIAL

## 2024-11-26 DIAGNOSIS — E78.5 DYSLIPIDEMIA: ICD-10-CM

## 2024-11-26 PROCEDURE — 75571 CT HRT W/O DYE W/CA TEST: CPT | Mod: TC

## 2024-12-02 ENCOUNTER — PATIENT MESSAGE (OUTPATIENT)
Dept: PRIMARY CARE CLINIC | Facility: CLINIC | Age: 59
End: 2024-12-02
Payer: COMMERCIAL

## 2024-12-09 ENCOUNTER — PATIENT MESSAGE (OUTPATIENT)
Dept: INTERNAL MEDICINE | Facility: CLINIC | Age: 59
End: 2024-12-09
Payer: COMMERCIAL

## 2024-12-09 DIAGNOSIS — Z00.00 WELLNESS EXAMINATION: Primary | ICD-10-CM

## 2024-12-09 RX ORDER — DICLOFENAC SODIUM 50 MG/1
50 TABLET, DELAYED RELEASE ORAL DAILY PRN
Qty: 30 TABLET | Refills: 0 | Status: SHIPPED | OUTPATIENT
Start: 2024-12-09 | End: 2025-01-08

## 2025-01-14 ENCOUNTER — ON-DEMAND VIRTUAL (OUTPATIENT)
Dept: URGENT CARE | Facility: CLINIC | Age: 60
End: 2025-01-14
Payer: COMMERCIAL

## 2025-01-14 DIAGNOSIS — J11.1 INFLUENZA: ICD-10-CM

## 2025-01-14 DIAGNOSIS — R68.89 FLU-LIKE SYMPTOMS: Primary | ICD-10-CM

## 2025-01-14 DIAGNOSIS — R05.9 COUGH, UNSPECIFIED TYPE: ICD-10-CM

## 2025-01-14 RX ORDER — PROMETHAZINE HYDROCHLORIDE AND DEXTROMETHORPHAN HYDROBROMIDE 6.25; 15 MG/5ML; MG/5ML
5 SYRUP ORAL 3 TIMES DAILY PRN
Qty: 118 ML | Refills: 0 | Status: SHIPPED | OUTPATIENT
Start: 2025-01-14 | End: 2025-01-23

## 2025-01-14 RX ORDER — OSELTAMIVIR PHOSPHATE 75 MG/1
75 CAPSULE ORAL 2 TIMES DAILY
Qty: 10 CAPSULE | Refills: 0 | Status: SHIPPED | OUTPATIENT
Start: 2025-01-14 | End: 2025-01-19

## 2025-01-14 NOTE — PROGRESS NOTES
Subjective:      Patient ID: Trista Nielsen is a 59 y.o. female.    Vitals:  vitals were not taken for this visit.     Chief Complaint: Cough      Visit Type: TELE AUDIOVISUAL    Present with the patient at the time of consultation: TELEMED PRESENT WITH PATIENT: Nabil chuck Bruner     Past Medical History:   Diagnosis Date    Personal history of colonic polyps 12/18/2018    adenomatous polyp. repeat 3-5 years. Julio Smith MD     Past Surgical History:   Procedure Laterality Date    CHOLECYSTECTOMY  11/8/2018    COLONOSCOPY W/ POLYPECTOMY  12/18/2018    adenomatous polyp. repeat 3-5 years. Julio Smith MD    COLONOSCOPY W/ POLYPECTOMY  09/26/2023    [facility]ar    [facility]      GALLBLADDER SURGERY      TUBAL LIGATION       Review of patient's allergies indicates:  No Known Allergies  Current Outpatient Medications on File Prior to Visit   Medication Sig Dispense Refill    CELEXA 40 mg tablet TAKE 1 TABLET BY MOUTH ONCE A DAY 90 tablet 3    cetirizine (ZYRTEC) 10 mg Cap       tirzepatide (MOUNJARO) 7.5 mg/0.5 mL PnIj Inject 5 mg into the skin every 7 days.       No current facility-administered medications on file prior to visit.     No family history on file.    Medications Ordered                [id number] - [address] [facility] [address] [address]    Telephone: [phone]   Fax: [phone]   Hours: Not open 24 hours                         E-Prescribed (2 of 2)              oseltamivir (TAMIFLU) 75 MG capsule    Sig: Take 1 capsule (75 mg total) by mouth 2 (two) times daily. for 5 days       Start: 1/14/25     Quantity: 10 capsule Refills: 0                         promethazine-dextromethorphan (PROMETHAZINE-DM) 6.25-15 mg/5 mL Syrp    Sig: Take 5 mLs by mouth 3 (three) times daily as needed (cough).       Start: 1/14/25     Quantity: 118 mL Refills: 0                           Ohs Peq Odvv Intake    1/14/2025 10:06 AM  CST - Filed by Patient   What is your current physical address in the event of a medical emergency? [address], [place]   Are you able to take your vital signs? No   Please attach any relevant images or files    Is your employer contracted with [employer]? No         Pt presents w/ c/o runny nose, body aches, ha, no fever x 2 days, exposed to the flu. Reports, taking flonase, netting, tylenol, nothing helping. Denies any CP, SOB, abdominal pain, nvd. Covid negative.         Constitution: Positive for fatigue. Negative for fever.   HENT:  Positive for congestion, sinus pain and sinus pressure.    Cardiovascular:  Negative for chest pain.   Respiratory:  Positive for cough. Negative for shortness of breath and wheezing.    Musculoskeletal:  Positive for muscle cramps.   Neurological:  Positive for headaches.        Objective:   The physical exam was conducted virtually.  Physical Exam   Constitutional: She is oriented to person, place, and time. She appears ill. No distress.   HENT:   Head: Normocephalic.   Neck: Neck supple.   Pulmonary/Chest: Effort normal. No respiratory distress.   Neurological: She is alert and oriented to person, place, and time.       Assessment:     1. Flu-like symptoms    2. Influenza    3. Cough, unspecified type        Plan:       Flu-like symptoms  -     oseltamivir (TAMIFLU) 75 MG capsule; Take 1 capsule (75 mg total) by mouth 2 (two) times daily. for 5 days  Dispense: 10 capsule; Refill: 0    Influenza  -     oseltamivir (TAMIFLU) 75 MG capsule; Take 1 capsule (75 mg total) by mouth 2 (two) times daily. for 5 days  Dispense: 10 capsule; Refill: 0    Cough, unspecified type  -     promethazine-dextromethorphan (PROMETHAZINE-DM) 6.25-15 mg/5 mL Syrp; Take 5 mLs by mouth 3 (three) times daily as needed (cough).  Dispense: 118 mL; Refill: 0      We appreciate you trusting us with your medical care. We hope you feel better soon. We will be happy to take care of  you for all of your future medical needs.     You must understand that you've received Virtual treatment only and that you may be released before all your medical problems are known or treated. You, the patient, will arrange for follow up care as instructed.     Follow up with your PCP or specialty clinic as directed in the next 3-5 days if not improved or as needed. You can call [phone] to schedule an appointment with the appropriate provider.     If your condition worsens we recommend that you receive another evaluation in person, with your primary care provider, urgent care or at the emergency room immediately or contact your primary medical clinics after hours call service to discuss your concerns.

## 2025-01-14 NOTE — LETTER
January 14, 2025    Trista Nielsen  [address]  [place] [address]             Virtual Visit - Urgent Care  Urgent Care  [address] [address]   January 14, 2025     Patient: Trista Nielsen   YOB: 1965   Date of Visit: 1/14/2025       To Whom it May Concern:    Trista Nielsen was seen virtually on 1/14/2025. She may return to work on 1/17/2025 .    Please excuse her from any classes or work missed.    If you have any questions or concerns, please don't hesitate to call.    Sincerely,         Mariah Hannah, NP

## 2025-01-14 NOTE — PATIENT INSTRUCTIONS
- Stay hydrated, drink plenty of water, and REST.  - OTC guaifenesin (plain Mucinex) for thick nasal/sinus congestion.    - OTC Robitussin DM or Mucinex DM for congestion with cough (guaifenesin + dextromethorphan).  - OTC Flonase or Nasonex for sinus congestion.   - OTC Simply Saline (e.g. Arm & Hammer) for irritated and raw nasal passages.  - OTC Vicks VapoCOOL spray and Cepacol throat lozenges for sore throat.  - OTC ibuprofen or acetaminophen alternating every 4-6 hours as needed for aches/pains/high fever.  - OTC Airborne or Emergen-C have ingredients like Zinc, Echinacea, and vitamin-C to help boost the immune system. Elderberry is also good for this.        If symptoms worsening or not improved f/u in person at the local Urgent Care or ER

## 2025-02-11 DIAGNOSIS — Z00.00 WELLNESS EXAMINATION: ICD-10-CM

## 2025-02-11 RX ORDER — DICLOFENAC SODIUM 50 MG/1
TABLET, DELAYED RELEASE ORAL
Qty: 30 TABLET | Refills: 0 | Status: SHIPPED | OUTPATIENT
Start: 2025-02-11

## 2025-03-06 ENCOUNTER — OFFICE VISIT (OUTPATIENT)
Dept: URGENT CARE | Facility: CLINIC | Age: 60
End: 2025-03-06
Payer: COMMERCIAL

## 2025-03-06 VITALS
BODY MASS INDEX: 36.7 KG/M2 | WEIGHT: 215 LBS | DIASTOLIC BLOOD PRESSURE: 82 MMHG | HEART RATE: 83 BPM | HEIGHT: 64 IN | OXYGEN SATURATION: 96 % | RESPIRATION RATE: 18 BRPM | SYSTOLIC BLOOD PRESSURE: 125 MMHG | TEMPERATURE: 99 F

## 2025-03-06 DIAGNOSIS — R05.9 COUGH, UNSPECIFIED TYPE: ICD-10-CM

## 2025-03-06 DIAGNOSIS — J10.1 INFLUENZA A: Primary | ICD-10-CM

## 2025-03-06 LAB
CTP QC/QA: YES
CTP QC/QA: YES
POC MOLECULAR INFLUENZA A AGN: POSITIVE
POC MOLECULAR INFLUENZA B AGN: NEGATIVE
SARS CORONAVIRUS 2 ANTIGEN: NEGATIVE

## 2025-03-06 RX ORDER — OSELTAMIVIR PHOSPHATE 75 MG/1
75 CAPSULE ORAL 2 TIMES DAILY
Qty: 10 CAPSULE | Refills: 0 | Status: SHIPPED | OUTPATIENT
Start: 2025-03-06 | End: 2025-03-11

## 2025-03-06 NOTE — PATIENT INSTRUCTIONS
Flu swab positive for influenza a, negative for influenza B.  Condition course and antiviral medication discussed  Adequate hydration and rest.   Warm saltwater gargles for sore throat.   Alternate Tylenol and ibuprofen every 3 hours for fever, body aches and headache.   Claritin 10 mg for nasal congestion.   Non infectious: No fever (100.4 and below) without fever reducing agent and overall improvement in the symptoms  Robitussin DM for cough and cold medication as needed and as directed.   Return to clinic as needed, call this clinic for any questions  Work excuse for tomorrow.

## 2025-03-06 NOTE — LETTER
March 6, 2025      [facility] [place] [address]  [facility] [address]  Phone: [phone]       Patient: Trista Nielsen   YOB: 1965  Date of Visit: 03/06/2025    To Whom It May Concern:    Génesis Nielsen  was at [facility] on 03/06/2025. The patient may return to work/school on 03/10/25 with no restrictions. If you have any questions or concerns, or if I can be of further assistance, please do not hesitate to contact me.    Sincerely,    Nicolas Church MA

## 2025-03-06 NOTE — PROGRESS NOTES
"Subjective:      Patient ID: Trista Nielsen is a 59 y.o. female.    Vitals:  height is 5' 4" (1.626 m) and weight is 97.5 kg (215 lb). Her oral temperature is 99.2 °F (37.3 °C). Her blood pressure is 125/82 and her pulse is 83. Her respiration is 18 and oxygen saturation is 96%.     Chief Complaint: Generalized Body Aches     Patient is a 59 y.o. female who presents to urgent care with complaints of congestion, cough, weak, low grade fever, headache, loss of appetite and body aches started since Yesterday. Alleviating factors include Tylenol  with no relief. Patient denies sore throat and N/V.     ROS :  Constitutional : _ fever , Body aches, Chills  Eyes : _No redness, drainage or pain  HENT_ as per HPI  Respiratory_no wheezing, no shortness of breath  Cardiovascular_no chest pain  Gastrointestinal_ nausea,No vomiting, No diarrhea, No abdominal pain  Musculoskeletal_no joint pain, no joint swelling  Integumentary_no skin rash    [place]:  59-year-old female present to clinic with concerns of low-grade fever, headache, congestion coughing associated with body aches and chills started yesterday.  Works in the hospital.  Co-worker with similar complaints.  No shortness a breath, no sore throat or difficulty swallowing.      Objective:     Physical Exam  General : Alert and Oriented, No apparent distress, afebrile  Neck - supple  HENT : Oropharynx no redness or swelling.  Bilateral TMs intact mild fluid no redness.   Respiratory : Bilateral equal breath sounds, nonlabored respirations  Cardiovascular : Rate, rhythm regular, normal volume pulse, no murmur  Gastrointestinal: Full abdomen, soft, nontender to palpate  Integumentary : Warm, Dry and no rash    Assessment:     1. Influenza A    2. Cough, unspecified type      Plan:   Flu swab positive for influenza a, negative for influenza B.  Condition course and antiviral medication discussed  Adequate hydration and rest.   Warm saltwater gargles for sore throat.   Alternate " Tylenol and ibuprofen every 3 hours for fever, body aches and headache.   Claritin 10 mg for nasal congestion.   Non infectious: No fever (100.4 and below) without fever reducing agent and overall improvement in the symptoms  Robitussin DM for cough and cold medication as needed and as directed.   Return to clinic as needed, call this clinic for any questions  Work excuse for tomorrow.    Influenza A  -     oseltamivir (TAMIFLU) 75 MG capsule; Take 1 capsule (75 mg total) by mouth 2 (two) times daily. for 5 days  Dispense: 10 capsule; Refill: 0    Cough, unspecified type  -     SARS Coronavirus 2 Antigen, POCT Manual Read  -     POCT Influenza A/B Molecular

## 2025-05-08 DIAGNOSIS — Z00.00 WELL ADULT EXAM: ICD-10-CM

## 2025-05-08 RX ORDER — CITALOPRAM 40 MG/1
40 TABLET, FILM COATED ORAL DAILY
Qty: 90 TABLET | Refills: 3 | Status: SHIPPED | OUTPATIENT
Start: 2025-05-08 | End: 2026-05-08

## 2025-06-10 DIAGNOSIS — Z00.00 WELLNESS EXAMINATION: ICD-10-CM

## 2025-06-10 RX ORDER — DICLOFENAC SODIUM 50 MG/1
50 TABLET, DELAYED RELEASE ORAL DAILY PRN
Qty: 30 TABLET | Refills: 2 | Status: SHIPPED | OUTPATIENT
Start: 2025-06-10 | End: 2026-06-10

## 2025-06-13 ENCOUNTER — PATIENT MESSAGE (OUTPATIENT)
Dept: INTERNAL MEDICINE | Facility: CLINIC | Age: 60
End: 2025-06-13

## 2025-06-13 ENCOUNTER — OFFICE VISIT (OUTPATIENT)
Dept: INTERNAL MEDICINE | Facility: CLINIC | Age: 60
End: 2025-06-13
Payer: COMMERCIAL

## 2025-06-13 DIAGNOSIS — E66.01 OBESITY, CLASS III, BMI 40-49.9 (MORBID OBESITY): Primary | ICD-10-CM

## 2025-06-13 RX ORDER — SEMAGLUTIDE 1 MG/.5ML
1 INJECTION, SOLUTION SUBCUTANEOUS
Qty: 2 ML | Refills: 0 | Status: SHIPPED | OUTPATIENT
Start: 2025-06-13

## 2025-06-13 RX ORDER — SEMAGLUTIDE 0.5 MG/.5ML
0.5 INJECTION, SOLUTION SUBCUTANEOUS
Qty: 2 ML | Refills: 0 | Status: SHIPPED | OUTPATIENT
Start: 2025-06-13

## 2025-06-13 RX ORDER — SEMAGLUTIDE 0.25 MG/.5ML
0.25 INJECTION, SOLUTION SUBCUTANEOUS
Qty: 2 ML | Refills: 0 | Status: SHIPPED | OUTPATIENT
Start: 2025-06-13

## 2025-06-13 NOTE — PROGRESS NOTES
"Patient ID: Trista Nielsen is a 59 y.o. White female    Subjective  Chief Complaint: patient presents for medical weight loss management.    Contraindications to GLP-1 receptor agonist therapy:   Denies personal or family history of MTC and personal history of MEN2     Co-morbidities: DLD        Weight loss history:  Pt reported=> Previously prescribed tirzepatide 5mg by a doctor at a bariatric center in [facility].    Starting weight:    6/12/2025   Recent Readings    Weight (lbs) 239 lb    BMI 42.33 BMI          Objective  Lab Results   Component Value Date     11/08/2024     11/03/2023     11/10/2018     Lab Results   Component Value Date    K 4.1 11/08/2024    K 3.6 11/03/2023    K 4.4 11/10/2018     Lab Results   Component Value Date     11/08/2024     11/03/2023     11/10/2018     Lab Results   Component Value Date    CO2 27 11/08/2024    CO2 28 11/03/2023    CO2 32.0 11/10/2018     Lab Results   Component Value Date    BUN 8.6 (L) 11/08/2024    BUN 9.4 (L) 11/03/2023    BUN 4.0 (L) 11/10/2018     Lab Results   Component Value Date    GLU 91 11/08/2024    GLU 88 11/03/2023    GLU 98 11/10/2018     Lab Results   Component Value Date    CALCIUM 9.6 11/08/2024    CALCIUM 9.6 11/03/2023    CALCIUM 8.1 (L) 11/10/2018     Lab Results   Component Value Date    PROT 7.6 11/08/2024    PROT 6.5 11/03/2023    PROT 5.9 (L) 11/10/2018     Lab Results   Component Value Date    ALBUMIN 3.9 11/08/2024    ALBUMIN 3.6 11/03/2023    ALBUMIN 2.70 (L) 11/10/2018     Lab Results   Component Value Date    BILITOT 0.8 11/08/2024    BILITOT 1.0 11/03/2023    BILITOT 0.4 11/10/2018     Lab Results   Component Value Date    AST 20 11/08/2024    AST 19 11/03/2023    AST 35 11/10/2018     Lab Results   Component Value Date    ALT 13 11/08/2024    ALT 13 11/03/2023    ALT 57 11/10/2018     No results found for: "ANIONGAP"  Lab Results   Component Value Date    CREATININE 0.75 11/08/2024    CREATININE 0.81 " 11/03/2023    CREATININE 0.55 11/10/2018     Lab Results   Component Value Date    EGFRNORACEVR >60 11/08/2024    EGFRNORACEVR >60 11/03/2023     Assessment/Plan  -Pt qualifies for GLP-1 RA therapy based on BMI greater than or equal to 30 kg/m2  - - Initiate Wegovy 0.25 mg once weekly for 1 month  - Then increase to Wegovy 0.5 mg once weekly for 1 month  - Then increase to Wegovy 1 mg once weekly  - RTC in 3 months for follow-up evaluation     Patient consented to pharmacist management via collaborative practice.

## 2025-07-09 ENCOUNTER — PATIENT MESSAGE (OUTPATIENT)
Dept: ADMINISTRATIVE | Facility: OTHER | Age: 60
End: 2025-07-09
Payer: COMMERCIAL

## 2025-08-03 ENCOUNTER — PATIENT MESSAGE (OUTPATIENT)
Dept: ADMINISTRATIVE | Facility: OTHER | Age: 60
End: 2025-08-03
Payer: COMMERCIAL

## 2025-08-28 DIAGNOSIS — E66.813 OBESITY, CLASS III, BMI 40-49.9 (MORBID OBESITY): ICD-10-CM

## 2025-08-28 RX ORDER — SEMAGLUTIDE 1 MG/.5ML
1 INJECTION, SOLUTION SUBCUTANEOUS
Qty: 2 ML | Refills: 0 | Status: ACTIVE | OUTPATIENT
Start: 2025-08-28

## 2025-08-29 ENCOUNTER — HOSPITAL ENCOUNTER (OUTPATIENT)
Dept: RADIOLOGY | Facility: HOSPITAL | Age: 60
Discharge: HOME | End: 2025-08-29
Attending: OBSTETRICS & GYNECOLOGY
Payer: COMMERCIAL